# Patient Record
Sex: FEMALE | Race: BLACK OR AFRICAN AMERICAN | NOT HISPANIC OR LATINO | Employment: OTHER | URBAN - NONMETROPOLITAN AREA
[De-identification: names, ages, dates, MRNs, and addresses within clinical notes are randomized per-mention and may not be internally consistent; named-entity substitution may affect disease eponyms.]

---

## 2017-01-20 ENCOUNTER — OFFICE VISIT (OUTPATIENT)
Dept: ONCOLOGY | Facility: CLINIC | Age: 67
End: 2017-01-20

## 2017-01-20 ENCOUNTER — LAB (OUTPATIENT)
Dept: ONCOLOGY | Facility: CLINIC | Age: 67
End: 2017-01-20

## 2017-01-20 VITALS
OXYGEN SATURATION: 97 % | TEMPERATURE: 98.2 F | SYSTOLIC BLOOD PRESSURE: 121 MMHG | WEIGHT: 129 LBS | RESPIRATION RATE: 18 BRPM | HEART RATE: 62 BPM | DIASTOLIC BLOOD PRESSURE: 82 MMHG

## 2017-01-20 DIAGNOSIS — D64.9 ANEMIA: ICD-10-CM

## 2017-01-20 DIAGNOSIS — D64.9 ANEMIA, UNSPECIFIED TYPE: Primary | ICD-10-CM

## 2017-01-20 DIAGNOSIS — D64.9 ANEMIA, UNSPECIFIED TYPE: ICD-10-CM

## 2017-01-20 LAB
ALBUMIN SERPL-MCNC: 4.2 G/DL (ref 3.4–4.8)
ALBUMIN/GLOB SERPL: 1.2 G/DL (ref 1.5–2.5)
ALP SERPL-CCNC: 74 U/L (ref 46–116)
ALT SERPL W P-5'-P-CCNC: 10 U/L (ref 10–36)
ANION GAP SERPL CALCULATED.3IONS-SCNC: 7.8 MMOL/L (ref 3.6–11.2)
AST SERPL-CCNC: 25 U/L (ref 10–30)
BASOPHILS # BLD AUTO: 0.03 10*3/MM3 (ref 0–0.3)
BASOPHILS NFR BLD AUTO: 0.5 % (ref 0–2)
BILIRUB SERPL-MCNC: 0.3 MG/DL (ref 0.2–1.8)
BUN BLD-MCNC: 16 MG/DL (ref 7–21)
BUN/CREAT SERPL: 12.8 (ref 7–25)
CALCIUM SPEC-SCNC: 9.6 MG/DL (ref 7.7–10)
CHLORIDE SERPL-SCNC: 111 MMOL/L (ref 99–112)
CO2 SERPL-SCNC: 24.2 MMOL/L (ref 24.3–31.9)
CREAT BLD-MCNC: 1.25 MG/DL (ref 0.43–1.29)
DEPRECATED RDW RBC AUTO: 42.8 FL (ref 37–54)
EOSINOPHIL # BLD AUTO: 0.11 10*3/MM3 (ref 0–0.7)
EOSINOPHIL NFR BLD AUTO: 1.9 % (ref 0–5)
ERYTHROCYTE [DISTWIDTH] IN BLOOD BY AUTOMATED COUNT: 13.6 % (ref 11.5–14.5)
FERRITIN SERPL-MCNC: 51 NG/ML (ref 10–290.3)
FOLATE SERPL-MCNC: 13.7 NG/ML (ref 5.4–20)
GFR SERPL CREATININE-BSD FRML MDRD: 52 ML/MIN/1.73
GLOBULIN UR ELPH-MCNC: 3.4 GM/DL
GLUCOSE BLD-MCNC: 95 MG/DL (ref 70–110)
HCT VFR BLD AUTO: 36.4 % (ref 37–47)
HGB BLD-MCNC: 12.2 G/DL (ref 12–16)
IMM GRANULOCYTES # BLD: 0.01 10*3/MM3 (ref 0–0.03)
IMM GRANULOCYTES NFR BLD: 0.2 % (ref 0–0.5)
IRON 24H UR-MRATE: 85 MCG/DL (ref 49–151)
IRON SATN MFR SERPL: 26 % (ref 15–50)
LYMPHOCYTES # BLD AUTO: 2.7 10*3/MM3 (ref 1–3)
LYMPHOCYTES NFR BLD AUTO: 46.1 % (ref 21–51)
MCH RBC QN AUTO: 29 PG (ref 27–33)
MCHC RBC AUTO-ENTMCNC: 33.5 G/DL (ref 33–37)
MCV RBC AUTO: 86.5 FL (ref 80–94)
MONOCYTES # BLD AUTO: 0.52 10*3/MM3 (ref 0.1–0.9)
MONOCYTES NFR BLD AUTO: 8.9 % (ref 0–10)
NEUTROPHILS # BLD AUTO: 2.49 10*3/MM3 (ref 1.4–6.5)
NEUTROPHILS NFR BLD AUTO: 42.4 % (ref 30–70)
OSMOLALITY SERPL CALC.SUM OF ELEC: 286 MOSM/KG (ref 273–305)
PLATELET # BLD AUTO: 217 10*3/MM3 (ref 130–400)
PMV BLD AUTO: 10.6 FL (ref 6–10)
POTASSIUM BLD-SCNC: 4.7 MMOL/L (ref 3.5–5.3)
PROT SERPL-MCNC: 7.6 G/DL (ref 6–8)
RBC # BLD AUTO: 4.21 10*6/MM3 (ref 4.2–5.4)
SODIUM BLD-SCNC: 143 MMOL/L (ref 135–153)
TIBC SERPL-MCNC: 328 MCG/DL (ref 241–421)
VIT B12 BLD-MCNC: 826 PG/ML (ref 211–911)
WBC NRBC COR # BLD: 5.86 10*3/MM3 (ref 4.5–12.5)

## 2017-01-20 PROCEDURE — 82728 ASSAY OF FERRITIN: CPT | Performed by: INTERNAL MEDICINE

## 2017-01-20 PROCEDURE — 82746 ASSAY OF FOLIC ACID SERUM: CPT | Performed by: INTERNAL MEDICINE

## 2017-01-20 PROCEDURE — 80053 COMPREHEN METABOLIC PANEL: CPT | Performed by: INTERNAL MEDICINE

## 2017-01-20 PROCEDURE — 85025 COMPLETE CBC W/AUTO DIFF WBC: CPT | Performed by: INTERNAL MEDICINE

## 2017-01-20 PROCEDURE — 83550 IRON BINDING TEST: CPT | Performed by: INTERNAL MEDICINE

## 2017-01-20 PROCEDURE — 83540 ASSAY OF IRON: CPT | Performed by: INTERNAL MEDICINE

## 2017-01-20 PROCEDURE — 99213 OFFICE O/P EST LOW 20 MIN: CPT | Performed by: INTERNAL MEDICINE

## 2017-01-20 PROCEDURE — 82607 VITAMIN B-12: CPT | Performed by: INTERNAL MEDICINE

## 2017-01-20 RX ORDER — OXYBUTYNIN CHLORIDE 5 MG/1
5 TABLET ORAL 2 TIMES DAILY
Refills: 2 | COMMUNITY
Start: 2017-01-06 | End: 2017-12-08 | Stop reason: SDUPTHER

## 2017-01-20 RX ORDER — METOCLOPRAMIDE 10 MG/1
10 TABLET ORAL 4 TIMES DAILY
Refills: 1 | COMMUNITY
Start: 2017-01-13

## 2017-01-20 RX ORDER — CYCLOBENZAPRINE HCL 10 MG
10 TABLET ORAL 3 TIMES DAILY PRN
Refills: 1 | COMMUNITY
Start: 2017-01-06

## 2017-01-20 NOTE — PROGRESS NOTES
Name:  Gemma Hernandez  :  1950  Date:  2017     REFERRING PROVIDER  BAYRON Nielsen    PRIMARY CARE PROVIDER  BAYRON Nielsen    REASON FOR FOLLOWUP  1. Anemia, unspecified type      CHIEF COMPLAINT  Chronic fatigue, stable.    Dear Ms. Sibley,    HISTORY OF PRESENT ILLNESS:   I saw Ms. Hernandez in follow up today in our medical oncology clinic. As you are aware, she is a pleasant, 66 y.o.,  female with a history of rheumatoid arthritis and coronary artery disease who, in late summer 2014, was  also been noted to be borderline anemic. She was admitted to [facility] (in early 2014) after presenting to their ED with weakness and black stools. She has been on Plavix and ASA due to a history of stent placement; but, according to her, several repeated endoscopies (both upper and lower) over the years (including during  her most recent hospitalization ) have been largely unremarkable (per the discharge summary, mainly gastritis only). She was referred to our clinic for further outpatient evaluation regarding her anemia. At the time of her initial appointment with us (2014), she confirmed that she receives monthly injections with golimumab for her rheumatoid arthri tis (which she believes helps quite a bit). She also reported intermittent rectal bleeding (and, as mentioned above, was admitted for it in early 2014). A number of basic anemia st udies were ordered at that time, and it was ultimately felt that, while chronic inflammation (from her arthritis) and chronic kidney disease (likely from longstanding hypertension;  her creatinine is in the 1.2 - 1.3 range, and her erythropoietin level was not appropriately elevated) were contributing, iron deficiency was playing an increasing role (her ferritin level had decreased, from 32 to 17). She was started on maintenance ferrous sulfate and has been doing overall well in follow up ever since, with  improved (and now stable) iron stores and a stable, borderline low to low normal hemoglobin.     INTERIM HISTORY:  Ms. Hernandez returns to clinic today for follow up.  She has been off of ferrous sulfate completely for a little over a year now.  She is back to her usual, laxative-dependent, chronic constipation (with no noticeable, repeat GI bleeds recently). She otherwise continues to feel overall well and, other than chronic, but stable, fatigue, continues to have no new or specific complaints.    Past Medical History   Diagnosis Date   • Anemia    • CAD (coronary artery disease)    • Chronic kidney disease    • Hypertension    • Rheumatoid arthritis    • Sleep apnea        Past Surgical History   Procedure Laterality Date   • Tubal abdominal ligation       1972   • Hernia repair       1972       Social History     Social History   • Marital status:      Spouse name: N/A   • Number of children: N/A   • Years of education: N/A     Occupational History   • Not on file.     Social History Main Topics   • Smoking status: Former Smoker     Packs/day: 3.00     Years: 31.00     Quit date: 2001   • Smokeless tobacco: Not on file   • Alcohol use No   • Drug use: No   • Sexual activity: Not on file     Other Topics Concern   • Not on file     Social History Narrative       Family History   Problem Relation Age of Onset   • Cancer Other      breast   • Lymphoma Other    • Aneurysm Father    • Cancer Sister    • Clotting disorder Sister    • Aneurysm Brother        Allergies   Allergen Reactions   • Arthritis Pain Regimen [Aspirin] Hives and Nausea And Vomiting     All oral arthritis medications   • Sulfa Antibiotics        Current Outpatient Prescriptions   Medication Sig Dispense Refill   • alendronate (FOSAMAX) 70 MG tablet      • ASPIR-LOW 81 MG EC tablet      • CARTIA  MG 24 hr capsule      • cetirizine (ZyrTEC) 10 MG tablet      • clonazePAM (KlonoPIN) 1 MG tablet      • clopidogrel (PLAVIX) 75 MG tablet       • fluticasone (FLONASE) 50 MCG/ACT nasal spray      • gabapentin (NEURONTIN) 400 MG capsule      • HYDROcodone-acetaminophen (NORCO) 7.5-325 MG per tablet      • lisinopril (PRINIVIL,ZESTRIL) 10 MG tablet      • nitroglycerin (NITRODUR) 0.6 MG/HR patch      • NITROSTAT 0.4 MG SL tablet      • pravastatin (PRAVACHOL) 40 MG tablet      • promethazine (PHENERGAN) 25 MG tablet      • SIMPONI 50 MG/0.5ML solution auto-injector      • simvastatin (ZOCOR) 20 MG tablet      • VOLTAREN 1 % gel gel      • cyclobenzaprine (FLEXERIL) 10 MG tablet   1   • metoclopramide (REGLAN) 10 MG tablet   1   • oxybutynin (DITROPAN) 5 MG tablet   2     No current facility-administered medications for this visit.        REVIEW OF SYSTEMS  CONSTITUTIONAL:  No fever, chills or night sweats. Chronic fatigue, stable.  EYES:  No blurry vision, diplopia or other vision changes.  ENT:  No hearing loss, nosebleeds or sore throat.  CARDIOVASCULAR:  No palpitations, arrhythmia, syncopal episodes or edema.  PULMONARY:  No hemoptysis, wheezing, chronic cough or shortness of breath.  GASTROINTESTINAL:  No nausea or vomiting.  No abdominal pain. Chronic constipation, stable and still manageable with prn laxatives.  GENITOURINARY:  No hematuria, kidney stones or frequent urination.  MUSCULOSKELETAL:  No joint or back pains.  INTEGUMENTARY: No rashes or pruritus.  ENDOCRINE:  No excessive thirst or hot flashes.  HEMATOLOGIC:  No history of free bleeding, spontaneous bleeding or clotting.  IMMUNOLOGIC:  No allergies or frequent infections.  NEUROLOGIC: No numbness, tingling, seizures or weakness.  PSYCHIATRIC:  No anxiety or depression.    PHYSICAL EXAMINATION    Visit Vitals   • /82   • Pulse 62   • Temp 98.2 °F (36.8 °C) (Oral)   • Resp 18   • Wt 129 lb (58.5 kg)   • SpO2 97%       GENERAL:  A well-developed, well-nourished,  female in no acute distress.  HEENT:  Pupils equally round and reactive to light.  Extraocular muscles  intact.  CARDIOVASCULAR:  Regular rate and rhythm.  No murmurs, gallops or rubs.  LUNGS:  Clear to auscultation bilaterally.  ABDOMEN:  Soft, nontender, nondistended with positive bowel sounds.  EXTREMITIES:  No clubbing, cyanosis or edema bilaterally.  SKIN:  No rashes or petechiae.  NEURO:  Cranial nerves grossly intact.  No focal deficits.  PSYCH:  Alert and oriented x3.    LABORATORY    Lab Results   Component Value Date    WBC 5.86 01/20/2017    HGB 12.2 01/20/2017    HCT 36.4 (L) 01/20/2017    MCV 86.5 01/20/2017     01/20/2017    NEUTROABS 2.49 01/20/2017       Lab Results   Component Value Date     01/20/2017    K 4.7 01/20/2017     01/20/2017    CO2 24.2 (L) 01/20/2017    BUN 16 01/20/2017    CREATININE 1.25 01/20/2017    GLUCOSE 95 01/20/2017    CALCIUM 9.6 01/20/2017    AST 25 01/20/2017    ALT 10 01/20/2017    ALKPHOS 74 01/20/2017    BILITOT 0.3 01/20/2017    PROTEINTOT 7.6 01/20/2017    ALBUMIN 4.20 01/20/2017     CBC (01/20/2017): WBCs: 5.8; HgB: 12.2; Hct: 36.4; platelets: 217; MCV: 86.5  CBC (07/20/2016): WBCs: 5.4; HgB: 11.4; Hct: 35.0; platelets: 228; MCV: 86.8  CBC (12/28/2015): WBCs: 5.1; HgB: 11.3; Hct: 32.7; platelets: 208; MCV: 81.5    CBC (06/26/2015): WBCs: 4.6; HgB: 11.7; Hct: 36.6; platelets: 226; MCV: 85.7    CBC (03/31/2015): WBCs: 5.5; HgB: 12.6; Hct: 39.2; platelets: 246; MCV: 86.5    CBC (12/18/2014): WBCs: 4.7; HgB: 11.9; Hct: 36.2; platelets: 238; MCV: 86.0    CBC (09/30/2014): WBCs: 5.6; HgB: 10.7; Hct: 33.3; platelets: 213; MCV: 85.6    CBC (08/02/2014): WBCs: 5.2; HgB: 11.5; Hct: 36.4; platelets: 239; MCV: 86     Iron profile (01/20/2017): serum iron: 85; % saturation: 26; TIBC: 328; ferritin: 51.0 ng/mL  Iron profile (07/20/2016): serum iron: 60; % saturation: 18; TIBC: 327; ferritin: 40.0    Iron profile (12/28/2015): serum iron: 83; % saturation: 28; TIBC: 292; ferritin: 60.0    Iron profile (06/26/2015): serum iron: 102; % saturation: 32; TIBC: 320;  ferritin: 47.0    Iron profile (03/31/2015): serum iron: 96; % saturation: 29; TIBC: 331; ferritin: 58.0    Iron profile (12/18/2014): serum iron: 55; % saturation: 21; TIBC: 263; ferritin: 34.0     Vitamin B12 (01/20/2017): 836 pg/mL; serum folate (01/20/2017): 13.70 ng/mL  Vitamin B12 (03/31/2015): 849; serum folate (03/31/2015): > 24.00    TSH (12/18/2014): 0.889; free T4: 0.87      Studies from 09/16/2014:    Iron panel: serum iron: 130; % saturation: 40; TIBC: 327; ferritin: 17.0    Vitamin B12: 538; methylmalonic acid: 155; serum folate: 18.89    Retic count: Percent: 0.8%; Absolute: 0.03    TSH: 0.747; Haptoglobin: 151    Erythropoietin, serum: 9.0      Serum ferritin (07/30/2014): 32.0      IMAGING    PATHOLOGY    IMPRESSION AND PLAN  Ms. Hernandez is a 66 y.o.,  female with:  Anemia: Multifactorial and mild, with a HgB generally in the low to mid 11 range at worst and otherwise unremarkable CBCs. Based on the initial workup performed at her initial clinic visit with us in September 2014, a few issues appear to have been (and some still are) playing a role: 1) Although her serum iron has remained within normal limits, her iron stores had been on the low side; and, by fall 2014, were getting worse (ferritin decreased from 32 ng/mL in late July to 17 by September 2014), suggesting a borderline iron deficiency that was not surprising, given her history of intermittent rectal bleeding and lifelong, routine menstrual blood loss (up until the late 2000s), 2) Her baseline creatinine of 1.2 - 1.3 and lack of an increased erythropoietin level suggested a component of chronic kidney disease, which would not be surprising given her history of hypertension and CAD, and 3) Although not severely elevated, the high normal ESR and history of rheumatoid arthritis may be contributing via an additional component of chronic inflammation. At that point, the most readily reversible issue, the borderline iron stores,  were treated with a new Rx for ferrous sulfate and today, over two years later, they remain much improved and adequate (ferritin of 51.0). She has actually not been taking ferrous sulfate for over a year now. Meanwhile, her HgB remains borderline low at worst (it is actually WNL today: 12.2 g/dL) and stable. We will continue to help monitor her on a routine basis (still [id number] for now, as we have been doing) with repeat labs and clinic visits. The patient was in agreement with this plan.    It is a pleasure to participate in Ms. Hernandez's care.  Please do not hesitate to call with any questions or concerns that you may have.    A total of 20 minutes were spent coordinating this patient’s care in clinic today; 15 minutes of which were face-to-face with the patient, reviewing her interim medical history, discussing the results of today's labs and counseling on the current followup plan.  All questions were answered to her satisfaction.    FOLLOW UP  Return to clinic in 6 months with a CBC, CMP, iron panel and ferritin level.        This document was electronically signed by MELISSA Yap MD January 20, 2017 3:00 PM      CC: BAYRON Nielsen

## 2017-01-20 NOTE — MR AVS SNAPSHOT
Gemma Hernandez   1/20/2017 2:30 PM   Office Visit    Dept Phone:  [phone]   Encounter #:  [id number]    Provider:  MELISSA Yap MD   Department:  [facility] HEMATOLOGY  AND ONCOLOGY                Your Full Care Plan              Today's Medication Changes          These changes are accurate as of: 1/20/17  3:20 PM.  If you have any questions, ask your nurse or doctor.               Stop taking medication(s)listed here:     fexofenadine 60 MG tablet   Commonly known as:  ALLEGRA   Stopped by:  MELISSA Yap MD           GENERLAC 10 GM/15ML solution solution (encephalopathy)   Generic drug:  lactulose   Stopped by:  MELISSA Yap MD           isosorbide mononitrate 30 MG 24 hr tablet   Commonly known as:  IMDUR   Stopped by:  MELISSA Yap MD                      Your Updated Medication List          This list is accurate as of: 1/20/17  3:20 PM.  Always use your most recent med list.                alendronate 70 MG tablet   Commonly known as:  FOSAMAX       ASPIR-LOW 81 MG EC tablet   Generic drug:  aspirin       CARTIA  MG 24 hr capsule   Generic drug:  diltiaZEM CD       cetirizine 10 MG tablet   Commonly known as:  zyrTEC       clonazePAM 1 MG tablet   Commonly known as:  KlonoPIN       clopidogrel 75 MG tablet   Commonly known as:  PLAVIX       cyclobenzaprine 10 MG tablet   Commonly known as:  FLEXERIL       fluticasone 50 MCG/ACT nasal spray   Commonly known as:  FLONASE       gabapentin 400 MG capsule   Commonly known as:  NEURONTIN       HYDROcodone-acetaminophen 7.5-325 MG per tablet   Commonly known as:  NORCO       lisinopril 10 MG tablet   Commonly known as:  PRINIVIL,ZESTRIL       metoclopramide 10 MG tablet   Commonly known as:  REGLAN       * NITROSTAT 0.4 MG SL tablet   Generic drug:  nitroglycerin       * nitroglycerin 0.6 MG/HR patch   Commonly known as:  NITRODUR       oxybutynin 5 MG tablet   Commonly known as:  DITROPAN       pravastatin 40 MG tablet   Commonly known as:  PRAVACHOL       promethazine 25 MG tablet   Commonly known as:  PHENERGAN       SIMPONI 50 MG/0.5ML solution auto-injector   Generic drug:  Golimumab       simvastatin 20 MG tablet   Commonly known as:  ZOCOR       VOLTAREN 1 % gel gel   Generic drug:  diclofenac       * Notice:  This list has 2 medication(s) that are the same as other medications prescribed for you. Read the directions carefully, and ask your doctor or other care provider to review them with you.            You Were Diagnosed With        Codes Comments    Anemia, unspecified type    -  Primary ICD-10-CM: D64.9  ICD-9-CM: 285.9       Instructions     None    Patient Instructions History      Upcoming Appointments     Visit Type Date Time Department    FOLLOW UP 1/20/2017  2:30 PM [facility]    LAB 1/20/2017  3:15 PM [facility]    LAB 7/20/2017  1:15 PM [facility]    FOLLOW UP 7/20/2017  1:45 PM [facility]      MyChart Signup     Our records indicate that you have declined [facility] MyChart signup. If you would like to sign up for [employer], please email [email or web address] or call [phone] to obtain an activation code.             Other Info from Your Visit           Your Appointments     Jul 20, 2017  1:15 PM EDT   LAB with [facility] NURSE LAB   [facility] HEMATOLOGY  AND ONCOLOGY ([place])    [address] [place] [address] [place] [address]   [phone]            Jul 20, 2017  1:45 PM EDT   Follow Up with MELISSA Yap MD   [facility] HEMATOLOGY  AND ONCOLOGY ([place])    [address] [place] [address] [place] [address]   [phone]           Arrive 15 minutes prior to appointment.              Allergies     Arthritis Pain Regimen [Aspirin]  Hives, Nausea And Vomiting    All oral arthritis medications    Sulfa Antibiotics        Vital Signs     Blood Pressure Pulse Temperature Respirations Weight Oxygen Saturation     121/82 62 98.2 °F (36.8 °C) (Oral) 18 129 lb (58.5 kg) 97%    Smoking Status                   Former Smoker           Problems and Diagnoses Noted     Anemia

## 2017-04-24 ENCOUNTER — TRANSCRIBE ORDERS (OUTPATIENT)
Dept: ADMINISTRATIVE | Facility: HOSPITAL | Age: 67
End: 2017-04-24

## 2017-04-24 DIAGNOSIS — R94.39 ABNORMAL STRESS TEST: Primary | ICD-10-CM

## 2017-05-02 ENCOUNTER — HOSPITAL ENCOUNTER (OUTPATIENT)
Facility: HOSPITAL | Age: 67
Setting detail: HOSPITAL OUTPATIENT SURGERY
Discharge: HOME | End: 2017-05-02
Attending: INTERNAL MEDICINE | Admitting: INTERNAL MEDICINE

## 2017-05-02 VITALS
HEIGHT: 61 IN | OXYGEN SATURATION: 99 % | DIASTOLIC BLOOD PRESSURE: 98 MMHG | WEIGHT: 129.19 LBS | RESPIRATION RATE: 16 BRPM | BODY MASS INDEX: 24.39 KG/M2 | SYSTOLIC BLOOD PRESSURE: 148 MMHG | HEART RATE: 59 BPM | TEMPERATURE: 98 F

## 2017-05-02 DIAGNOSIS — R94.39 ABNORMAL STRESS TEST: ICD-10-CM

## 2017-05-02 LAB
ANION GAP SERPL CALCULATED.3IONS-SCNC: 4 MMOL/L (ref 3–11)
BUN BLD-MCNC: 15 MG/DL (ref 9–23)
BUN/CREAT SERPL: 15 (ref 7–25)
CALCIUM SPEC-SCNC: 10 MG/DL (ref 8.7–10.4)
CHLORIDE SERPL-SCNC: 110 MMOL/L (ref 99–109)
CO2 SERPL-SCNC: 26 MMOL/L (ref 20–31)
CREAT BLD-MCNC: 1 MG/DL (ref 0.6–1.3)
DEPRECATED RDW RBC AUTO: 46.6 FL (ref 37–54)
ERYTHROCYTE [DISTWIDTH] IN BLOOD BY AUTOMATED COUNT: 14 % (ref 11.3–14.5)
GFR SERPL CREATININE-BSD FRML MDRD: 68 ML/MIN/1.73
GLUCOSE BLD-MCNC: 96 MG/DL (ref 70–100)
HCT VFR BLD AUTO: 35.4 % (ref 34.5–44)
HGB BLD-MCNC: 11.2 G/DL (ref 11.5–15.5)
MCH RBC QN AUTO: 28.4 PG (ref 27–31)
MCHC RBC AUTO-ENTMCNC: 31.6 G/DL (ref 32–36)
MCV RBC AUTO: 89.8 FL (ref 80–99)
PLATELET # BLD AUTO: 229 10*3/MM3 (ref 150–450)
PMV BLD AUTO: 9.8 FL (ref 6–12)
POTASSIUM BLD-SCNC: 4.2 MMOL/L (ref 3.5–5.5)
RBC # BLD AUTO: 3.94 10*6/MM3 (ref 3.89–5.14)
SODIUM BLD-SCNC: 140 MMOL/L (ref 132–146)
WBC NRBC COR # BLD: 5.88 10*3/MM3 (ref 3.5–10.8)

## 2017-05-02 PROCEDURE — 80048 BASIC METABOLIC PNL TOTAL CA: CPT | Performed by: INTERNAL MEDICINE

## 2017-05-02 PROCEDURE — 93458 L HRT ARTERY/VENTRICLE ANGIO: CPT | Performed by: INTERNAL MEDICINE

## 2017-05-02 PROCEDURE — C1769 GUIDE WIRE: HCPCS | Performed by: INTERNAL MEDICINE

## 2017-05-02 PROCEDURE — 25010000002 FENTANYL CITRATE (PF) 100 MCG/2ML SOLUTION: Performed by: INTERNAL MEDICINE

## 2017-05-02 PROCEDURE — 25010000002 MIDAZOLAM PER 1 MG: Performed by: INTERNAL MEDICINE

## 2017-05-02 PROCEDURE — 25010000002 HEPARIN (PORCINE) PER 1000 UNITS: Performed by: INTERNAL MEDICINE

## 2017-05-02 PROCEDURE — 85027 COMPLETE CBC AUTOMATED: CPT | Performed by: INTERNAL MEDICINE

## 2017-05-02 PROCEDURE — 36415 COLL VENOUS BLD VENIPUNCTURE: CPT

## 2017-05-02 PROCEDURE — 0 IOPAMIDOL PER 1 ML: Performed by: INTERNAL MEDICINE

## 2017-05-02 PROCEDURE — C1894 INTRO/SHEATH, NON-LASER: HCPCS | Performed by: INTERNAL MEDICINE

## 2017-05-02 RX ORDER — NALOXONE HCL 0.4 MG/ML
0.4 VIAL (ML) INJECTION
Status: DISCONTINUED | OUTPATIENT
Start: 2017-05-02 | End: 2017-05-02 | Stop reason: HOSPADM

## 2017-05-02 RX ORDER — ACETAMINOPHEN 325 MG/1
650 TABLET ORAL EVERY 4 HOURS PRN
Status: DISCONTINUED | OUTPATIENT
Start: 2017-05-02 | End: 2017-05-02 | Stop reason: HOSPADM

## 2017-05-02 RX ORDER — LIDOCAINE HYDROCHLORIDE 10 MG/ML
INJECTION, SOLUTION INFILTRATION; PERINEURAL AS NEEDED
Status: DISCONTINUED | OUTPATIENT
Start: 2017-05-02 | End: 2017-05-02 | Stop reason: HOSPADM

## 2017-05-02 RX ORDER — MIDAZOLAM HYDROCHLORIDE 1 MG/ML
INJECTION INTRAMUSCULAR; INTRAVENOUS AS NEEDED
Status: DISCONTINUED | OUTPATIENT
Start: 2017-05-02 | End: 2017-05-02 | Stop reason: HOSPADM

## 2017-05-02 RX ORDER — HYDROCODONE BITARTRATE AND ACETAMINOPHEN 5; 325 MG/1; MG/1
1 TABLET ORAL EVERY 4 HOURS PRN
Status: DISCONTINUED | OUTPATIENT
Start: 2017-05-02 | End: 2017-05-02 | Stop reason: HOSPADM

## 2017-05-02 RX ORDER — SODIUM CHLORIDE 9 MG/ML
250 INJECTION, SOLUTION INTRAVENOUS CONTINUOUS
Status: ACTIVE | OUTPATIENT
Start: 2017-05-02 | End: 2017-05-02

## 2017-05-02 RX ORDER — FENTANYL CITRATE 50 UG/ML
INJECTION, SOLUTION INTRAMUSCULAR; INTRAVENOUS AS NEEDED
Status: DISCONTINUED | OUTPATIENT
Start: 2017-05-02 | End: 2017-05-02 | Stop reason: HOSPADM

## 2017-05-02 RX ORDER — MORPHINE SULFATE 2 MG/ML
1 INJECTION, SOLUTION INTRAMUSCULAR; INTRAVENOUS EVERY 4 HOURS PRN
Status: DISCONTINUED | OUTPATIENT
Start: 2017-05-02 | End: 2017-05-02 | Stop reason: HOSPADM

## 2017-05-02 RX ORDER — TEMAZEPAM 7.5 MG/1
7.5 CAPSULE ORAL NIGHTLY PRN
Status: DISCONTINUED | OUTPATIENT
Start: 2017-05-02 | End: 2017-05-02 | Stop reason: HOSPADM

## 2017-05-02 RX ORDER — ALPRAZOLAM 0.25 MG/1
0.25 TABLET ORAL 3 TIMES DAILY PRN
Status: DISCONTINUED | OUTPATIENT
Start: 2017-05-02 | End: 2017-05-02 | Stop reason: HOSPADM

## 2017-05-02 RX ADMIN — HYDROCODONE BITARTRATE AND ACETAMINOPHEN 1 TABLET: 5; 325 TABLET ORAL at 15:06

## 2017-07-26 ENCOUNTER — OFFICE VISIT (OUTPATIENT)
Dept: ONCOLOGY | Facility: CLINIC | Age: 67
End: 2017-07-26

## 2017-07-26 ENCOUNTER — LAB (OUTPATIENT)
Dept: ONCOLOGY | Facility: CLINIC | Age: 67
End: 2017-07-26

## 2017-07-26 VITALS
HEART RATE: 74 BPM | BODY MASS INDEX: 24.56 KG/M2 | DIASTOLIC BLOOD PRESSURE: 81 MMHG | RESPIRATION RATE: 18 BRPM | WEIGHT: 130 LBS | SYSTOLIC BLOOD PRESSURE: 123 MMHG | OXYGEN SATURATION: 96 % | TEMPERATURE: 98 F

## 2017-07-26 DIAGNOSIS — D64.9 ANEMIA, UNSPECIFIED TYPE: ICD-10-CM

## 2017-07-26 DIAGNOSIS — D64.9 ANEMIA, UNSPECIFIED TYPE: Primary | ICD-10-CM

## 2017-07-26 LAB
ALBUMIN SERPL-MCNC: 4.4 G/DL (ref 3.4–4.8)
ALBUMIN/GLOB SERPL: 1.2 G/DL (ref 1.5–2.5)
ALP SERPL-CCNC: 81 U/L (ref 35–104)
ALT SERPL W P-5'-P-CCNC: 14 U/L (ref 10–36)
ANION GAP SERPL CALCULATED.3IONS-SCNC: 7.4 MMOL/L (ref 3.6–11.2)
AST SERPL-CCNC: 25 U/L (ref 10–30)
BASOPHILS # BLD AUTO: 0.03 10*3/MM3 (ref 0–0.3)
BASOPHILS NFR BLD AUTO: 0.5 % (ref 0–2)
BILIRUB SERPL-MCNC: 0.3 MG/DL (ref 0.2–1.8)
BUN BLD-MCNC: 30 MG/DL (ref 7–21)
BUN/CREAT SERPL: 25.9 (ref 7–25)
CALCIUM SPEC-SCNC: 9.5 MG/DL (ref 7.7–10)
CHLORIDE SERPL-SCNC: 106 MMOL/L (ref 99–112)
CO2 SERPL-SCNC: 24.6 MMOL/L (ref 24.3–31.9)
CREAT BLD-MCNC: 1.16 MG/DL (ref 0.43–1.29)
DEPRECATED RDW RBC AUTO: 41.9 FL (ref 37–54)
EOSINOPHIL # BLD AUTO: 0.12 10*3/MM3 (ref 0–0.7)
EOSINOPHIL NFR BLD AUTO: 1.9 % (ref 0–5)
ERYTHROCYTE [DISTWIDTH] IN BLOOD BY AUTOMATED COUNT: 13.6 % (ref 11.5–14.5)
FERRITIN SERPL-MCNC: 54 NG/ML (ref 10–290.3)
GFR SERPL CREATININE-BSD FRML MDRD: 57 ML/MIN/1.73
GLOBULIN UR ELPH-MCNC: 3.6 GM/DL
GLUCOSE BLD-MCNC: 98 MG/DL (ref 70–110)
HCT VFR BLD AUTO: 36 % (ref 37–47)
HGB BLD-MCNC: 12 G/DL (ref 12–16)
IMM GRANULOCYTES # BLD: 0.02 10*3/MM3 (ref 0–0.03)
IMM GRANULOCYTES NFR BLD: 0.3 % (ref 0–0.5)
IRON 24H UR-MRATE: 88 MCG/DL (ref 49–151)
IRON SATN MFR SERPL: 27 % (ref 15–50)
LYMPHOCYTES # BLD AUTO: 2.45 10*3/MM3 (ref 1–3)
LYMPHOCYTES NFR BLD AUTO: 39.7 % (ref 21–51)
MCH RBC QN AUTO: 28.3 PG (ref 27–33)
MCHC RBC AUTO-ENTMCNC: 33.3 G/DL (ref 33–37)
MCV RBC AUTO: 84.9 FL (ref 80–94)
MONOCYTES # BLD AUTO: 0.33 10*3/MM3 (ref 0.1–0.9)
MONOCYTES NFR BLD AUTO: 5.3 % (ref 0–10)
NEUTROPHILS # BLD AUTO: 3.22 10*3/MM3 (ref 1.4–6.5)
NEUTROPHILS NFR BLD AUTO: 52.3 % (ref 30–70)
OSMOLALITY SERPL CALC.SUM OF ELEC: 281.8 MOSM/KG (ref 273–305)
PLATELET # BLD AUTO: 231 10*3/MM3 (ref 130–400)
PMV BLD AUTO: 9.9 FL (ref 6–10)
POTASSIUM BLD-SCNC: 4.9 MMOL/L (ref 3.5–5.3)
PROT SERPL-MCNC: 8 G/DL (ref 6–8)
RBC # BLD AUTO: 4.24 10*6/MM3 (ref 4.2–5.4)
SODIUM BLD-SCNC: 138 MMOL/L (ref 135–153)
TIBC SERPL-MCNC: 321 MCG/DL (ref 241–421)
WBC NRBC COR # BLD: 6.17 10*3/MM3 (ref 4.5–12.5)

## 2017-07-26 PROCEDURE — 85025 COMPLETE CBC W/AUTO DIFF WBC: CPT | Performed by: INTERNAL MEDICINE

## 2017-07-26 PROCEDURE — 82728 ASSAY OF FERRITIN: CPT | Performed by: INTERNAL MEDICINE

## 2017-07-26 PROCEDURE — 80053 COMPREHEN METABOLIC PANEL: CPT | Performed by: INTERNAL MEDICINE

## 2017-07-26 PROCEDURE — 99213 OFFICE O/P EST LOW 20 MIN: CPT | Performed by: INTERNAL MEDICINE

## 2017-07-26 PROCEDURE — 83550 IRON BINDING TEST: CPT | Performed by: INTERNAL MEDICINE

## 2017-07-26 PROCEDURE — 83540 ASSAY OF IRON: CPT | Performed by: INTERNAL MEDICINE

## 2017-07-26 NOTE — PROGRESS NOTES
Name:  Gemma Hernandez  :  1950  Date:  2017     REFERRING PROVIDER  BAYRON Nielsen    PRIMARY CARE PROVIDER  Zaira Cleary MD    REASON FOR FOLLOWUP  1. Anemia, unspecified type      CHIEF COMPLAINT  Chronic fatigue, stable.    Dear Ms. Sibley,    HISTORY OF PRESENT ILLNESS:   I saw Ms. Hernandez in follow up today in our medical oncology clinic. As you are aware, she is a pleasant, 66 y.o.,  female with a history of rheumatoid arthritis and coronary artery disease who, in late summer 2014, was  also been noted to be borderline anemic. She was admitted to [facility] (in early 2014) after presenting to their ED with weakness and black stools. She has been on Plavix and ASA due to a history of stent placement; but, according to her, several repeated endoscopies (both upper and lower) over the years (including during  her most recent hospitalization ) have been largely unremarkable (per the discharge summary, mainly gastritis only). She was referred to our clinic for further outpatient evaluation regarding her anemia. At the time of her initial appointment with us (2014), she confirmed that she receives monthly injections with golimumab for her rheumatoid arthri tis (which she believes helps quite a bit). She also reported intermittent rectal bleeding (and, as mentioned above, was admitted for it in early 2014). A number of basic anemia st udies were ordered at that time, and it was ultimately felt that, while chronic inflammation (from her arthritis) and chronic kidney disease (likely from longstanding hypertension;  her creatinine is in the 1.2 - 1.3 range, and her erythropoietin level was not appropriately elevated) were contributing, iron deficiency was playing an increasing role (her ferritin level had decreased, from 32 to 17). She was started on, at most, maintenance ferrous sulfate and has been doing overall well in follow up ever since,  with improved (and now stable) iron stores and a stable, borderline low to low normal hemoglobin.     INTERIM HISTORY:  Ms. Hernandez returns to clinic today for follow up.  She has been off of ferrous sulfate completely for a little over one and one half years now  She is back to her usual, laxative-dependent, chronic constipation (with no noticeable, repeat GI bleeds recently). She otherwise continues to feel overall well and, other than chronic, but stable, fatigue, continues to have no new or specific complaints.    Past Medical History:   Diagnosis Date   • Anemia    • CAD (coronary artery disease)    • Chronic kidney disease    • Hypertension    • Rheumatoid arthritis    • Sleep apnea        Past Surgical History:   Procedure Laterality Date   • CARDIAC CATHETERIZATION     • CARDIAC CATHETERIZATION N/A 5/2/2017    Procedure: Left Heart Cath;  Surgeon: Dickson Lea MD;  Location: [facility] CATH INVASIVE LOCATION;  Service:    • HERNIA REPAIR      1972   • TUBAL ABDOMINAL LIGATION      1972       Social History     Social History   • Marital status:      Spouse name: N/A   • Number of children: N/A   • Years of education: N/A     Occupational History   • Not on file.     Social History Main Topics   • Smoking status: Former Smoker     Packs/day: 3.00     Years: 31.00     Quit date: 2001   • Smokeless tobacco: Never Used   • Alcohol use No   • Drug use: No   • Sexual activity: Defer     Other Topics Concern   • Not on file     Social History Narrative       Family History   Problem Relation Age of Onset   • Cancer Other      breast   • Lymphoma Other    • Aneurysm Father    • Cancer Sister    • Clotting disorder Sister    • Aneurysm Brother        Allergies   Allergen Reactions   • Arthritis Pain Regimen [Aspirin] Hives and Nausea And Vomiting     All oral arthritis medications   • Sulfa Antibiotics        Current Outpatient Prescriptions   Medication Sig Dispense Refill   • Acetaminophen (TYLENOL) 325  MG capsule Take 650 mg by mouth Every 8 (Eight) Hours As Needed.     • alendronate (FOSAMAX) 70 MG tablet Every 7 (Seven) Days.     • ASPIR-LOW 81 MG EC tablet      • CARTIA  MG 24 hr capsule Take 240 mg by mouth Daily.     • cetirizine (ZyrTEC) 10 MG tablet Take 10 mg by mouth Daily.     • clonazePAM (KlonoPIN) 1 MG tablet 1 mg 2 (Two) Times a Day As Needed.     • clopidogrel (PLAVIX) 75 MG tablet      • cyclobenzaprine (FLEXERIL) 10 MG tablet Take 10 mg by mouth 3 (Three) Times a Day As Needed.  1   • fluticasone (FLONASE) 50 MCG/ACT nasal spray 2 sprays into each nostril Daily.     • gabapentin (NEURONTIN) 400 MG capsule Take 400 mg by mouth 3 (Three) Times a Day.     • HYDROcodone-acetaminophen (NORCO) 7.5-325 MG per tablet 1 tablet 2 (Two) Times a Day.     • lisinopril (PRINIVIL,ZESTRIL) 10 MG tablet Take 10 mg by mouth Daily.     • metoclopramide (REGLAN) 10 MG tablet Take 10 mg by mouth 4 (Four) Times a Day.  1   • nitroglycerin (NITRODUR) 0.6 MG/HR patch      • NITROSTAT 0.4 MG SL tablet      • oxybutynin (DITROPAN) 5 MG tablet Take 5 mg by mouth 2 (Two) Times a Day.  2   • pravastatin (PRAVACHOL) 40 MG tablet 40 mg Daily.     • promethazine (PHENERGAN) 25 MG tablet Take 25 mg by mouth Every 6 (Six) Hours As Needed.     • SIMPONI 50 MG/0.5ML solution auto-injector Inject 1 dose into the shoulder, thigh, or buttocks Every 30 (Thirty) Days.     • simvastatin (ZOCOR) 20 MG tablet Take 20 mg by mouth Daily.     • VOLTAREN 1 % gel gel Apply 4 g topically 2 (Two) Times a Day.       No current facility-administered medications for this visit.        REVIEW OF SYSTEMS  CONSTITUTIONAL:  No fever, chills or night sweats. Chronic fatigue, stable.  EYES:  No blurry vision, diplopia or other vision changes.  ENT:  No hearing loss, nosebleeds or sore throat.  CARDIOVASCULAR:  No palpitations, arrhythmia, syncopal episodes or edema.  PULMONARY:  No hemoptysis, wheezing, chronic cough or shortness of  breath.  GASTROINTESTINAL:  No nausea or vomiting.  No abdominal pain. Chronic constipation, stable and still manageable with prn laxatives.  GENITOURINARY:  No hematuria, kidney stones or frequent urination.  MUSCULOSKELETAL:  No joint or back pains.  INTEGUMENTARY: No rashes or pruritus.  ENDOCRINE:  No excessive thirst or hot flashes.  HEMATOLOGIC:  No history of free bleeding, spontaneous bleeding or clotting.  IMMUNOLOGIC:  No allergies or frequent infections.  NEUROLOGIC: No numbness, tingling, seizures or weakness.  PSYCHIATRIC:  No anxiety or depression.    PHYSICAL EXAMINATION    /81  Pulse 74  Temp 98 °F (36.7 °C) (Oral)   Resp 18  Wt 130 lb (59 kg)  SpO2 96%  BMI 24.56 kg/m2    GENERAL:  A well-developed, well-nourished,  female in no acute distress.  HEENT:  Pupils equally round and reactive to light.  Extraocular muscles intact.  CARDIOVASCULAR:  Regular rate and rhythm.  No murmurs, gallops or rubs.  LUNGS:  Clear to auscultation bilaterally.  ABDOMEN:  Soft, nontender, nondistended with positive bowel sounds.  EXTREMITIES:  No clubbing, cyanosis or edema bilaterally.  SKIN:  No rashes or petechiae.  NEURO:  Cranial nerves grossly intact.  No focal deficits.  PSYCH:  Alert and oriented x3.    LABORATORY    Lab Results   Component Value Date    WBC 6.17 07/26/2017    HGB 12.0 07/26/2017    HCT 36.0 (L) 07/26/2017    MCV 84.9 07/26/2017     07/26/2017    NEUTROABS 3.22 07/26/2017       Lab Results   Component Value Date     05/02/2017    K 4.2 05/02/2017     (H) 05/02/2017    CO2 26.0 05/02/2017    BUN 15 05/02/2017    CREATININE 1.00 05/02/2017    GLUCOSE 96 05/02/2017    CALCIUM 10.0 05/02/2017    AST 25 01/20/2017    ALT 10 01/20/2017    ALKPHOS 74 01/20/2017    BILITOT 0.3 01/20/2017    PROTEINTOT 7.6 01/20/2017    ALBUMIN 4.20 01/20/2017     CBC (07/26/2017): WBCs: 6.1; HgB: 12.0; Hct: 36.0; platelets: 231; MCV: 84.9  CBC (05/02/2017): WBCs: 5.8; HgB:  11.2; Hct: 35.4; platelets: 229; MCV: 89.8  CBC (01/20/2017): WBCs: 5.8; HgB: 12.2; Hct: 36.4; platelets: 217; MCV: 86.5  CBC (07/20/2016): WBCs: 5.4; HgB: 11.4; Hct: 35.0; platelets: 228; MCV: 86.8  CBC (12/28/2015): WBCs: 5.1; HgB: 11.3; Hct: 32.7; platelets: 208; MCV: 81.5    CBC (06/26/2015): WBCs: 4.6; HgB: 11.7; Hct: 36.6; platelets: 226; MCV: 85.7    CBC (03/31/2015): WBCs: 5.5; HgB: 12.6; Hct: 39.2; platelets: 246; MCV: 86.5    CBC (12/18/2014): WBCs: 4.7; HgB: 11.9; Hct: 36.2; platelets: 238; MCV: 86.0    CBC (09/30/2014): WBCs: 5.6; HgB: 10.7; Hct: 33.3; platelets: 213; MCV: 85.6    CBC (08/02/2014): WBCs: 5.2; HgB: 11.5; Hct: 36.4; platelets: 239; MCV: 86     Iron profile (07/26/2017): serum iron: 88; % saturation: 27; TIBC: 321; ferritin: 54.0 ng/mL  Iron profile (01/20/2017): serum iron: 85; % saturation: 26; TIBC: 328; ferritin: 51.0  Iron profile (07/20/2016): serum iron: 60; % saturation: 18; TIBC: 327; ferritin: 40.0    Iron profile (12/28/2015): serum iron: 83; % saturation: 28; TIBC: 292; ferritin: 60.0    Iron profile (06/26/2015): serum iron: 102; % saturation: 32; TIBC: 320; ferritin: 47.0    Iron profile (03/31/2015): serum iron: 96; % saturation: 29; TIBC: 331; ferritin: 58.0    Iron profile (12/18/2014): serum iron: 55; % saturation: 21; TIBC: 263; ferritin: 34.0     Vitamin B12 (01/20/2017): 836 pg/mL; serum folate (01/20/2017): 13.70 ng/mL  Vitamin B12 (03/31/2015): 849; serum folate (03/31/2015): > 24.00    TSH (12/18/2014): 0.889; free T4: 0.87      Studies from 09/16/2014:    Iron panel: serum iron: 130; % saturation: 40; TIBC: 327; ferritin: 17.0    Vitamin B12: 538; methylmalonic acid: 155; serum folate: 18.89    Retic count: Percent: 0.8%; Absolute: 0.03    TSH: 0.747; Haptoglobin: 151    Erythropoietin, serum: 9.0      Serum ferritin (07/30/2014): 32.0      IMAGING    PATHOLOGY    IMPRESSION AND PLAN  Ms. Hernandez is a 66 y.o.,  female with:  Anemia: Multifactorial  and mild, with a HgB generally in the low to mid 11 range at worst and otherwise unremarkable CBCs. Based on the initial workup performed at her initial clinic visit with us in September 2014, a few issues appear to have been (and some still are) playing a role: 1) Although her serum iron has remained within normal limits, her iron stores had been on the low side; and, by fall 2014, were getting worse (ferritin decreased from 32 ng/mL in late July to 17 by September 2014), suggesting a borderline iron deficiency that was not surprising, given her history of intermittent rectal bleeding and lifelong, routine menstrual blood loss (up until the late 2000s), 2) Her baseline creatinine of 1.2 - 1.3 and lack of an increased erythropoietin level suggested a component of chronic kidney disease, which would not be surprising given her history of hypertension and arterosclerotic disease, and 3) Although not severely elevated, the high normal ESR and history of rheumatoid arthritis may be contributing via an additional component of chronic inflammation. At that point, the most readily reversible issue, the borderline iron stores, were treated with a new Rx for ferrous sulfate and today, over two and one half years later, they remain much improved, adequate and stable (ferritin of 54.0 ng/mL). She has actually not been taking ferrous sulfate for well over one and one half years now. Meanwhile, her HgB remains borderline low at worst (it is actually WNL again today: 12.0 g/dL) and stable. We will continue to help monitor her on a routine basis (still [id number] for now, as we have been doing) with repeat labs and clinic visits. The patient was in agreement with this plan.    It is a pleasure to participate in Ms. Hernandez's care.  Please do not hesitate to call with any questions or concerns that you may have.    A total of 20 minutes were spent coordinating this patient’s care in clinic today; 15 minutes of which were  face-to-face with the patient, reviewing her interim medical history, discussing the results of today's labs and counseling on the current followup plan.  All questions were answered to her satisfaction.    FOLLOW UP  Return to clinic in 6 months with a CBC, CMP, iron panel and ferritin level.        This document was electronically signed by MELISSA Yap MD July 26, 2017 1:35 PM      CC: BAYRON Nielsen

## 2017-09-12 ENCOUNTER — OFFICE VISIT (OUTPATIENT)
Dept: UROLOGY | Facility: CLINIC | Age: 67
End: 2017-09-12

## 2017-09-12 DIAGNOSIS — N39.0 URINARY TRACT INFECTION WITH HEMATURIA, SITE UNSPECIFIED: Primary | ICD-10-CM

## 2017-09-12 DIAGNOSIS — N18.2 CHRONIC KIDNEY DISEASE, STAGE II (MILD): ICD-10-CM

## 2017-09-12 DIAGNOSIS — R31.9 URINARY TRACT INFECTION WITH HEMATURIA, SITE UNSPECIFIED: Primary | ICD-10-CM

## 2017-09-12 LAB
BILIRUB BLD-MCNC: NEGATIVE MG/DL
CLARITY, POC: CLEAR
COLOR UR: YELLOW
GLUCOSE UR STRIP-MCNC: NEGATIVE MG/DL
KETONES UR QL: NEGATIVE
LEUKOCYTE EST, POC: NEGATIVE
NITRITE UR-MCNC: NEGATIVE MG/ML
PH UR: 6 [PH] (ref 5–8)
PROT UR STRIP-MCNC: NEGATIVE MG/DL
RBC # UR STRIP: ABNORMAL /UL
SP GR UR: 1.01 (ref 1–1.03)
UROBILINOGEN UR QL: NORMAL

## 2017-09-12 PROCEDURE — 99213 OFFICE O/P EST LOW 20 MIN: CPT | Performed by: UROLOGY

## 2017-09-12 RX ORDER — NITROFURANTOIN 25; 75 MG/1; MG/1
100 CAPSULE ORAL DAILY
Qty: 20 CAPSULE | Refills: 0 | Status: SHIPPED | OUTPATIENT
Start: 2017-09-12

## 2017-09-12 NOTE — PROGRESS NOTES
Chief Complaint:          Chief Complaint   Patient presents with   • Urinary Tract Infection       HPI:   66 y.o. female.  66-year-old black female from [facility] who has known stage II chronic kidney disease recurrent urinary tract infections currently today complains of suprapubic pain, frequency, urgency, urge related incontinence, there is no dysuria.  She has no pain.  She has no fevers or chills.  She is very constipated.  She sees Dr. Cleary is on the litany of pain medication.  As the most likely etiology of her recurrent constipation her urinalysis was essentially negative but she does well with treatment, going to initiate therapy after discussion with Macrobid which is working very well the past.  She does not need a repeat BMP because her chronic kidney disease is been quite stable and followed by Dr. Cleary.Recurrent urinary tract infections-patient has been referred and diagnosed with recurrent urinary tract infections.  We discussed the types of organisms that are found in the urinary tract indicating that the vast majority are results of the patient's own gastrointestinal mimi.  We discussed how many of the antibiotics that are utilized can actually exacerbate these infections by creating resistant organisms and there is only a very few antibiotics that are concentrated in the urine and do not affect the rectal reservoir nor cause recurrent yeast vaginitis.  We discussed the risk factors for recurrent infections being intercourse in younger patients and atrophic changes in older patients.  We discussed the symptoms that are found including pain, pressure, burning, frequency, urgency suprapubic pain and painful intercourse.  I discussed upper tract symptoms including fevers, chills, and indicated the workup would be much more aggressive if the patient were to present with recurrent infections in the face of upper tract symptomatology such as fever.  I discussed the history of vesicoureteral  reflux in young patients and finally chronic renal scarring as a result of such.  I recommend concomitant probiotics with treatment with antibiotics to protect the rectal reservoir including over-the-counter yogurt preparations to gisele oral pills containing the appropriate probiotics.        Past Medical History:        Past Medical History:   Diagnosis Date   • Anemia    • CAD (coronary artery disease)    • Chronic kidney disease    • Hypertension    • Rheumatoid arthritis    • Sleep apnea          Current Meds:     Current Outpatient Prescriptions   Medication Sig Dispense Refill   • Acetaminophen (TYLENOL) 325 MG capsule Take 650 mg by mouth Every 8 (Eight) Hours As Needed.     • alendronate (FOSAMAX) 70 MG tablet Every 7 (Seven) Days.     • ASPIR-LOW 81 MG EC tablet      • CARTIA  MG 24 hr capsule Take 240 mg by mouth Daily.     • cetirizine (ZyrTEC) 10 MG tablet Take 10 mg by mouth Daily.     • clonazePAM (KlonoPIN) 1 MG tablet 1 mg 2 (Two) Times a Day As Needed.     • clopidogrel (PLAVIX) 75 MG tablet      • cyclobenzaprine (FLEXERIL) 10 MG tablet Take 10 mg by mouth 3 (Three) Times a Day As Needed.  1   • fluticasone (FLONASE) 50 MCG/ACT nasal spray 2 sprays into each nostril Daily.     • gabapentin (NEURONTIN) 400 MG capsule Take 400 mg by mouth 3 (Three) Times a Day.     • HYDROcodone-acetaminophen (NORCO) 7.5-325 MG per tablet 1 tablet 2 (Two) Times a Day.     • lisinopril (PRINIVIL,ZESTRIL) 10 MG tablet Take 10 mg by mouth Daily.     • metoclopramide (REGLAN) 10 MG tablet Take 10 mg by mouth 4 (Four) Times a Day.  1   • nitroglycerin (NITRODUR) 0.6 MG/HR patch      • NITROSTAT 0.4 MG SL tablet      • oxybutynin (DITROPAN) 5 MG tablet Take 5 mg by mouth 2 (Two) Times a Day.  2   • pravastatin (PRAVACHOL) 40 MG tablet 40 mg Daily.     • promethazine (PHENERGAN) 25 MG tablet Take 25 mg by mouth Every 6 (Six) Hours As Needed.     • SIMPONI 50 MG/0.5ML solution auto-injector Inject 1 dose into the  shoulder, thigh, or buttocks Every 30 (Thirty) Days.     • simvastatin (ZOCOR) 20 MG tablet Take 20 mg by mouth Daily.     • VOLTAREN 1 % gel gel Apply 4 g topically 2 (Two) Times a Day.       No current facility-administered medications for this visit.         Allergies:      Allergies   Allergen Reactions   • Arthritis Pain Regimen [Aspirin] Hives and Nausea And Vomiting     All oral arthritis medications   • Sulfa Antibiotics         Past Surgical History:     Past Surgical History:   Procedure Laterality Date   • CARDIAC CATHETERIZATION     • CARDIAC CATHETERIZATION N/A 5/2/2017    Procedure: Left Heart Cath;  Surgeon: Dickson Lea MD;  Location: [facility] CATH INVASIVE LOCATION;  Service:    • HERNIA REPAIR      1972   • TUBAL ABDOMINAL LIGATION      1972         Social History:     Social History     Social History   • Marital status:      Spouse name: N/A   • Number of children: N/A   • Years of education: N/A     Occupational History   • Not on file.     Social History Main Topics   • Smoking status: Former Smoker     Packs/day: 3.00     Years: 31.00     Quit date: 2001   • Smokeless tobacco: Never Used   • Alcohol use No   • Drug use: No   • Sexual activity: Defer     Other Topics Concern   • Not on file     Social History Narrative       Family History:     Family History   Problem Relation Age of Onset   • Cancer Other      breast   • Lymphoma Other    • Aneurysm Father    • Cancer Sister    • Clotting disorder Sister    • Aneurysm Brother        Review of Systems:     Review of Systems   Constitutional: Negative.  Negative for activity change, appetite change, chills, diaphoresis, fatigue and unexpected weight change.   HENT: Negative for congestion, dental problem, drooling, ear discharge, ear pain, facial swelling, hearing loss, mouth sores, nosebleeds, postnasal drip, rhinorrhea, sinus pressure, sneezing, sore throat, tinnitus, trouble swallowing and voice change.    Eyes: Negative.   Negative for photophobia, pain, discharge, redness, itching and visual disturbance.   Respiratory: Negative.  Negative for apnea, cough, choking, chest tightness, shortness of breath, wheezing and stridor.    Cardiovascular: Negative.  Negative for chest pain, palpitations and leg swelling.   Gastrointestinal: Negative.  Negative for abdominal distention, abdominal pain, anal bleeding, blood in stool, constipation, diarrhea, nausea, rectal pain and vomiting.   Endocrine: Negative.  Negative for cold intolerance, heat intolerance, polydipsia, polyphagia and polyuria.   Genitourinary: Positive for dysuria, flank pain, frequency and urgency.   Musculoskeletal: Negative for arthralgias, back pain, gait problem, joint swelling, myalgias, neck pain and neck stiffness.   Skin: Negative.  Negative for color change, pallor, rash and wound.   Allergic/Immunologic: Negative.  Negative for environmental allergies, food allergies and immunocompromised state.   Neurological: Negative.  Negative for dizziness, tremors, seizures, syncope, facial asymmetry, speech difficulty, weakness, light-headedness, numbness and headaches.   Hematological: Negative.  Negative for adenopathy. Does not bruise/bleed easily.   Psychiatric/Behavioral: Negative for agitation, behavioral problems, confusion, decreased concentration, dysphoric mood, hallucinations, self-injury, sleep disturbance and suicidal ideas. The patient is not nervous/anxious and is not hyperactive.    All other systems reviewed and are negative.      Physical Exam:     Physical Exam   Constitutional: She appears well-developed and well-nourished.   HENT:   Head: Normocephalic and atraumatic.   Right Ear: External ear normal.   Left Ear: External ear normal.   Mouth/Throat: Oropharynx is clear and moist.   Eyes: Conjunctivae are normal. Pupils are equal, round, and reactive to light.   Cardiovascular: Normal rate, regular rhythm, normal heart sounds and intact distal pulses.     Pulmonary/Chest: Effort normal and breath sounds normal.   Abdominal: Soft. Bowel sounds are normal. She exhibits no distension and no mass. There is no tenderness. There is no rebound and no guarding.   Genitourinary: Vagina normal. No vaginal discharge found.   Genitourinary Comments: Soft nontender abdomen with no organomegaly, rigidity, guarding or tenderness.  Normal vaginal orifice.  No evidence of prolapse no palpable masses.  No significant perineal body abnormalities and a normal external anus.  Neurologic exam is nonfocal.   Musculoskeletal: Normal range of motion.   Neurological: She is alert. She has normal reflexes.   Skin: Skin is warm and dry.   Psychiatric: She has a normal mood and affect. Her behavior is normal. Judgment and thought content normal.       Procedure:       Assessment:   No diagnosis found.  No orders of the defined types were placed in this encounter.      Plan:   Recurrent urinary tract infections-patient has been referred and diagnosed with recurrent urinary tract infections.  We discussed the types of organisms that are found in the urinary tract indicating that the vast majority are results of the patient's own gastrointestinal mimi.  We discussed how many of the antibiotics that are utilized can actually exacerbate these infections by creating resistant organisms and there is only a very few antibiotics that are concentrated in the urine and do not affect the rectal reservoir nor cause recurrent yeast vaginitis.  We discussed the risk factors for recurrent infections being intercourse in younger patients and atrophic changes in older patients.  We discussed the symptoms that are found including pain, pressure, burning, frequency, urgency suprapubic pain and painful intercourse.  I discussed upper tract symptoms including fevers, chills, and indicated the workup would be much more aggressive if the patient were to present with recurrent infections in the face of upper tract  symptomatology such as fever.  I discussed the history of vesicoureteral reflux in young patients and finally chronic renal scarring as a result of such.  I recommend concomitant probiotics with treatment with antibiotics to protect the rectal reservoir including over-the-counter yogurt preparations to gisele oral pills containing the appropriate probiotics.  He was started empirically on Macrodantin I'll follow-up with her based on this  H2 chronic kidney disease           This document has been electronically signed by SLADE ROGER MD September 12, 2017 12:53 PM

## 2017-09-13 PROBLEM — N39.0 URINARY TRACT INFECTION WITH HEMATURIA: Status: ACTIVE | Noted: 2017-09-13

## 2017-09-13 PROBLEM — N18.2 CHRONIC KIDNEY DISEASE, STAGE II (MILD): Status: ACTIVE | Noted: 2017-09-13

## 2017-09-13 PROBLEM — R31.9 URINARY TRACT INFECTION WITH HEMATURIA: Status: ACTIVE | Noted: 2017-09-13

## 2017-10-26 ENCOUNTER — OFFICE VISIT (OUTPATIENT)
Dept: UROLOGY | Facility: CLINIC | Age: 67
End: 2017-10-26

## 2017-10-26 DIAGNOSIS — N18.2 CHRONIC KIDNEY DISEASE, STAGE II (MILD): Primary | ICD-10-CM

## 2017-10-26 DIAGNOSIS — N39.0 URINARY TRACT INFECTION WITH HEMATURIA, SITE UNSPECIFIED: ICD-10-CM

## 2017-10-26 DIAGNOSIS — R31.9 URINARY TRACT INFECTION WITH HEMATURIA, SITE UNSPECIFIED: ICD-10-CM

## 2017-10-26 PROCEDURE — 99213 OFFICE O/P EST LOW 20 MIN: CPT | Performed by: UROLOGY

## 2017-10-26 NOTE — PROGRESS NOTES
Chief Complaint:          Chief Complaint   Patient presents with   • Urinary Tract Infection       HPI:   66 y.o. female.  66-year-old black female from [facility] who has known stage II chronic kidney disease recurrent urinary tract infections currently today complains of suprapubic pain, frequency, urgency, urge related incontinence, there is no dysuria.  She has no pain.  She has no fevers or chills.  She is very constipated.  She sees Dr. Cleary is on the litany of pain medication.  As the most likely etiology of her recurrent constipation her urinalysis was essentially negative but she does well with treatment, going to initiate therapy after discussion with Macrobid which is working very well the past.  She does not need a repeat BMP because her chronic kidney disease is been quite stable and followed by Dr. Cleary.Recurrent urinary tract infections-patient has been referred and diagnosed with recurrent urinary tract infections.  We discussed the types of organisms that are found in the urinary tract indicating that the vast majority are results of the patient's own gastrointestinal mimi.  We discussed how many of the antibiotics that are utilized can actually exacerbate these infections by creating resistant organisms and there is only a very few antibiotics that are concentrated in the urine and do not affect the rectal reservoir nor cause recurrent yeast vaginitis.  We discussed the risk factors for recurrent infections being intercourse in younger patients and atrophic changes in older patients.  We discussed the symptoms that are found including pain, pressure, burning, frequency, urgency suprapubic pain and painful intercourse.  I discussed upper tract symptoms including fevers, chills, and indicated the workup would be much more aggressive if the patient were to present with recurrent infections in the face of upper tract symptomatology such as fever.  I discussed the history of vesicoureteral  reflux in young patients and finally chronic renal scarring as a result of such.  I recommend concomitant probiotics with treatment with antibiotics to protect the rectal reservoir including over-the-counter yogurt preparations to gisele oral pills containing the appropriate probiotics.      She returns she is completely asymptomatic his absolute no burning blood dysuria she sees Dr. Cleary she has low back pain she has severe constipation I see her back in 6 months pending any additional recurrent urinary tract infections        Past Medical History:        Past Medical History:   Diagnosis Date   • Anemia    • CAD (coronary artery disease)    • Chronic kidney disease    • Hypertension    • Rheumatoid arthritis    • Sleep apnea          Current Meds:     Current Outpatient Prescriptions   Medication Sig Dispense Refill   • Acetaminophen (TYLENOL) 325 MG capsule Take 650 mg by mouth Every 8 (Eight) Hours As Needed.     • alendronate (FOSAMAX) 70 MG tablet Every 7 (Seven) Days.     • ASPIR-LOW 81 MG EC tablet      • CARTIA  MG 24 hr capsule Take 240 mg by mouth Daily.     • cetirizine (ZyrTEC) 10 MG tablet Take 10 mg by mouth Daily.     • clonazePAM (KlonoPIN) 1 MG tablet 1 mg 2 (Two) Times a Day As Needed.     • clopidogrel (PLAVIX) 75 MG tablet      • cyclobenzaprine (FLEXERIL) 10 MG tablet Take 10 mg by mouth 3 (Three) Times a Day As Needed.  1   • fluticasone (FLONASE) 50 MCG/ACT nasal spray 2 sprays into each nostril Daily.     • gabapentin (NEURONTIN) 400 MG capsule Take 400 mg by mouth 3 (Three) Times a Day.     • HYDROcodone-acetaminophen (NORCO) 7.5-325 MG per tablet 1 tablet 2 (Two) Times a Day.     • lisinopril (PRINIVIL,ZESTRIL) 10 MG tablet Take 10 mg by mouth Daily.     • metoclopramide (REGLAN) 10 MG tablet Take 10 mg by mouth 4 (Four) Times a Day.  1   • nitrofurantoin, macrocrystal-monohydrate, (MACROBID) 100 MG capsule Take 1 capsule by mouth Daily. 20 capsule 0   • nitroglycerin (NITRODUR) 0.6  MG/HR patch      • NITROSTAT 0.4 MG SL tablet      • oxybutynin (DITROPAN) 5 MG tablet Take 5 mg by mouth 2 (Two) Times a Day.  2   • pravastatin (PRAVACHOL) 40 MG tablet 40 mg Daily.     • promethazine (PHENERGAN) 25 MG tablet Take 25 mg by mouth Every 6 (Six) Hours As Needed.     • SIMPONI 50 MG/0.5ML solution auto-injector Inject 1 dose into the shoulder, thigh, or buttocks Every 30 (Thirty) Days.     • simvastatin (ZOCOR) 20 MG tablet Take 20 mg by mouth Daily.     • VOLTAREN 1 % gel gel Apply 4 g topically 2 (Two) Times a Day.       No current facility-administered medications for this visit.         Allergies:      Allergies   Allergen Reactions   • Arthritis Pain Regimen [Aspirin] Hives and Nausea And Vomiting     All oral arthritis medications   • Sulfa Antibiotics         Past Surgical History:     Past Surgical History:   Procedure Laterality Date   • CARDIAC CATHETERIZATION     • CARDIAC CATHETERIZATION N/A 5/2/2017    Procedure: Left Heart Cath;  Surgeon: Dickson eLa MD;  [facility];  Service:    • HERNIA REPAIR      1972   • TUBAL ABDOMINAL LIGATION      1972         Social History:     Social History     Social History   • Marital status:      Spouse name: N/A   • Number of children: N/A   • Years of education: N/A     Occupational History   • Not on file.     Social History Main Topics   • Smoking status: Former Smoker     Packs/day: 3.00     Years: 31.00     Quit date: 2001   • Smokeless tobacco: Never Used   • Alcohol use No   • Drug use: No   • Sexual activity: Defer     Other Topics Concern   • Not on file     Social History Narrative       Family History:     Family History   Problem Relation Age of Onset   • Cancer Other      breast   • Lymphoma Other    • Aneurysm Father    • Cancer Sister    • Clotting disorder Sister    • Aneurysm Brother        Review of Systems:     Review of Systems   Constitutional: Negative.  Negative for activity change,  appetite change, chills, diaphoresis, fatigue and unexpected weight change.   HENT: Negative for congestion, dental problem, drooling, ear discharge, ear pain, facial swelling, hearing loss, mouth sores, nosebleeds, postnasal drip, rhinorrhea, sinus pressure, sneezing, sore throat, tinnitus, trouble swallowing and voice change.    Eyes: Negative.  Negative for photophobia, pain, discharge, redness, itching and visual disturbance.   Respiratory: Negative.  Negative for apnea, cough, choking, chest tightness, shortness of breath, wheezing and stridor.    Cardiovascular: Negative.  Negative for chest pain, palpitations and leg swelling.   Gastrointestinal: Negative.  Negative for abdominal distention, abdominal pain, anal bleeding, blood in stool, constipation, diarrhea, nausea, rectal pain and vomiting.   Endocrine: Negative.  Negative for cold intolerance, heat intolerance, polydipsia, polyphagia and polyuria.   Musculoskeletal: Negative.  Negative for arthralgias, back pain, gait problem, joint swelling, myalgias, neck pain and neck stiffness.   Skin: Negative.  Negative for color change, pallor, rash and wound.   Allergic/Immunologic: Negative.  Negative for environmental allergies, food allergies and immunocompromised state.   Neurological: Negative.  Negative for dizziness, tremors, seizures, syncope, facial asymmetry, speech difficulty, weakness, light-headedness, numbness and headaches.   Hematological: Negative.  Negative for adenopathy. Does not bruise/bleed easily.   Psychiatric/Behavioral: Negative for agitation, behavioral problems, confusion, decreased concentration, dysphoric mood, hallucinations, self-injury, sleep disturbance and suicidal ideas. The patient is not nervous/anxious and is not hyperactive.    All other systems reviewed and are negative.      Physical Exam:     Physical Exam   Constitutional: She appears well-developed and well-nourished.   HENT:   Head: Normocephalic and atraumatic.    Right Ear: External ear normal.   Left Ear: External ear normal.   Mouth/Throat: Oropharynx is clear and moist.   Eyes: Conjunctivae are normal. Pupils are equal, round, and reactive to light.   Cardiovascular: Normal rate, regular rhythm, normal heart sounds and intact distal pulses.    Pulmonary/Chest: Effort normal and breath sounds normal.   Abdominal: Soft. Bowel sounds are normal. She exhibits no distension and no mass. There is no tenderness. There is no rebound and no guarding.   Genitourinary: Vagina normal. No vaginal discharge found.   Musculoskeletal: Normal range of motion.   Neurological: She is alert. She has normal reflexes.   Skin: Skin is warm and dry.   Psychiatric: She has a normal mood and affect. Her behavior is normal. Judgment and thought content normal.       Procedure:       Assessment:   No diagnosis found.  No orders of the defined types were placed in this encounter.      Plan:   Recurrent urinary tract infections-patient has been referred and diagnosed with recurrent urinary tract infections.  We discussed the types of organisms that are found in the urinary tract indicating that the vast majority are results of the patient's own gastrointestinal mimi.  We discussed how many of the antibiotics that are utilized can actually exacerbate these infections by creating resistant organisms and there is only a very few antibiotics that are concentrated in the urine and do not affect the rectal reservoir nor cause recurrent yeast vaginitis.  We discussed the risk factors for recurrent infections being intercourse in younger patients and atrophic changes in older patients.  We discussed the symptoms that are found including pain, pressure, burning, frequency, urgency suprapubic pain and painful intercourse.  I discussed upper tract symptoms including fevers, chills, and indicated the workup would be much more aggressive if the patient were to present with recurrent infections in the face of  upper tract symptomatology such as fever.  I discussed the history of vesicoureteral reflux in young patients and finally chronic renal scarring as a result of such.  I recommend concomitant probiotics with treatment with antibiotics to protect the rectal reservoir including over-the-counter yogurt preparations to gisele oral pills containing the appropriate probiotics.  It has completely resolved all see her back in 6 months           This document has been electronically signed by SLADE ROGER MD October 26, 2017 1:17 PM

## 2017-12-08 DIAGNOSIS — R35.0 URINARY FREQUENCY: Primary | ICD-10-CM

## 2017-12-08 RX ORDER — OXYBUTYNIN CHLORIDE 5 MG/1
TABLET ORAL
Qty: 30 TABLET | Refills: 11 | Status: SHIPPED | OUTPATIENT
Start: 2017-12-08

## 2018-01-25 ENCOUNTER — OFFICE VISIT (OUTPATIENT)
Dept: ONCOLOGY | Facility: CLINIC | Age: 68
End: 2018-01-25

## 2018-01-25 ENCOUNTER — LAB (OUTPATIENT)
Dept: ONCOLOGY | Facility: CLINIC | Age: 68
End: 2018-01-25

## 2018-01-25 VITALS
DIASTOLIC BLOOD PRESSURE: 87 MMHG | RESPIRATION RATE: 18 BRPM | SYSTOLIC BLOOD PRESSURE: 137 MMHG | TEMPERATURE: 98.1 F | WEIGHT: 131.1 LBS | OXYGEN SATURATION: 100 % | HEART RATE: 74 BPM | BODY MASS INDEX: 24.77 KG/M2

## 2018-01-25 DIAGNOSIS — D64.9 ANEMIA, UNSPECIFIED TYPE: ICD-10-CM

## 2018-01-25 DIAGNOSIS — D64.9 ANEMIA, UNSPECIFIED TYPE: Primary | ICD-10-CM

## 2018-01-25 LAB
ALBUMIN SERPL-MCNC: 4.3 G/DL (ref 3.4–4.8)
ALBUMIN/GLOB SERPL: 1.4 G/DL (ref 1.5–2.5)
ALP SERPL-CCNC: 71 U/L (ref 35–104)
ALT SERPL W P-5'-P-CCNC: 16 U/L (ref 10–36)
ANION GAP SERPL CALCULATED.3IONS-SCNC: 5.3 MMOL/L (ref 3.6–11.2)
AST SERPL-CCNC: 21 U/L (ref 10–30)
BASOPHILS # BLD AUTO: 0.03 10*3/MM3 (ref 0–0.3)
BASOPHILS NFR BLD AUTO: 0.5 % (ref 0–2)
BILIRUB SERPL-MCNC: 0.3 MG/DL (ref 0.2–1.8)
BUN BLD-MCNC: 29 MG/DL (ref 7–21)
BUN/CREAT SERPL: 27.9 (ref 7–25)
CALCIUM SPEC-SCNC: 9.6 MG/DL (ref 7.7–10)
CHLORIDE SERPL-SCNC: 111 MMOL/L (ref 99–112)
CO2 SERPL-SCNC: 24.7 MMOL/L (ref 24.3–31.9)
CREAT BLD-MCNC: 1.04 MG/DL (ref 0.43–1.29)
DEPRECATED RDW RBC AUTO: 43.7 FL (ref 37–54)
EOSINOPHIL # BLD AUTO: 0.09 10*3/MM3 (ref 0–0.7)
EOSINOPHIL NFR BLD AUTO: 1.5 % (ref 0–7)
ERYTHROCYTE [DISTWIDTH] IN BLOOD BY AUTOMATED COUNT: 14 % (ref 11.5–14.5)
FERRITIN SERPL-MCNC: 52 NG/ML (ref 10–290.3)
GFR SERPL CREATININE-BSD FRML MDRD: 64 ML/MIN/1.73
GLOBULIN UR ELPH-MCNC: 3 GM/DL
GLUCOSE BLD-MCNC: 85 MG/DL (ref 70–110)
HCT VFR BLD AUTO: 34.2 % (ref 37–47)
HGB BLD-MCNC: 11.1 G/DL (ref 12–16)
IMM GRANULOCYTES # BLD: 0 10*3/MM3 (ref 0–0.03)
IMM GRANULOCYTES NFR BLD: 0 % (ref 0–0.5)
IRON 24H UR-MRATE: 75 MCG/DL (ref 49–151)
IRON SATN MFR SERPL: 22 % (ref 15–50)
LYMPHOCYTES # BLD AUTO: 2.59 10*3/MM3 (ref 1–3)
LYMPHOCYTES NFR BLD AUTO: 44.3 % (ref 16–46)
MCH RBC QN AUTO: 28.4 PG (ref 27–33)
MCHC RBC AUTO-ENTMCNC: 32.5 G/DL (ref 33–37)
MCV RBC AUTO: 87.5 FL (ref 80–94)
MONOCYTES # BLD AUTO: 0.61 10*3/MM3 (ref 0.1–0.9)
MONOCYTES NFR BLD AUTO: 10.4 % (ref 0–12)
NEUTROPHILS # BLD AUTO: 2.53 10*3/MM3 (ref 1.4–6.5)
NEUTROPHILS NFR BLD AUTO: 43.3 % (ref 40–75)
OSMOLALITY SERPL CALC.SUM OF ELEC: 286.3 MOSM/KG (ref 273–305)
PLATELET # BLD AUTO: 251 10*3/MM3 (ref 130–400)
PMV BLD AUTO: 10.1 FL (ref 6–10)
POTASSIUM BLD-SCNC: 3.9 MMOL/L (ref 3.5–5.3)
PROT SERPL-MCNC: 7.3 G/DL (ref 6–8)
RBC # BLD AUTO: 3.91 10*6/MM3 (ref 4.2–5.4)
SODIUM BLD-SCNC: 141 MMOL/L (ref 135–153)
TIBC SERPL-MCNC: 344 MCG/DL (ref 241–421)
WBC NRBC COR # BLD: 5.85 10*3/MM3 (ref 4.5–12.5)

## 2018-01-25 PROCEDURE — 83550 IRON BINDING TEST: CPT | Performed by: INTERNAL MEDICINE

## 2018-01-25 PROCEDURE — 83540 ASSAY OF IRON: CPT | Performed by: INTERNAL MEDICINE

## 2018-01-25 PROCEDURE — 82728 ASSAY OF FERRITIN: CPT | Performed by: INTERNAL MEDICINE

## 2018-01-25 PROCEDURE — 99213 OFFICE O/P EST LOW 20 MIN: CPT | Performed by: INTERNAL MEDICINE

## 2018-01-25 PROCEDURE — 85025 COMPLETE CBC W/AUTO DIFF WBC: CPT | Performed by: INTERNAL MEDICINE

## 2018-01-25 PROCEDURE — 80053 COMPREHEN METABOLIC PANEL: CPT | Performed by: INTERNAL MEDICINE

## 2018-01-25 NOTE — PROGRESS NOTES
Name:  Gemma Hernandez  :  1950  Date:  2018     REFERRING PROVIDER  BAYRON Nielsen    PRIMARY CARE PROVIDER  Zaira Cleary MD    REASON FOR FOLLOWUP  1. Anemia, unspecified type      CHIEF COMPLAINT  Chronic fatigue, stable.    Dear Ms. Sibley,    HISTORY OF PRESENT ILLNESS:   I saw Ms. Hernandez in follow up today in our medical oncology clinic. As you are aware, she is a pleasant, 67 y.o.,  female with a history of rheumatoid arthritis and coronary artery disease who, in late summer 2014, was  also been noted to be borderline anemic. She was admitted to [facility] (in early 2014) after presenting to their ED with weakness and black stools. She has been on Plavix and ASA due to a history of stent placement; but, according to her, several repeated endoscopies (both upper and lower) over the years (including during  her most recent hospitalization ) have been largely unremarkable (per the discharge summary, mainly gastritis only). She was referred to our clinic for further outpatient evaluation regarding her anemia. At the time of her initial appointment with us (2014), she confirmed that she receives monthly injections with golimumab for her rheumatoid arthri tis (which she believes helps quite a bit). She also reported intermittent rectal bleeding (and, as mentioned above, was admitted for it in early 2014). A number of basic anemia st udies were ordered at that time, and it was ultimately felt that, while chronic inflammation (from her arthritis) and chronic kidney disease (likely from longstanding hypertension;  her creatinine is in the 1.2 - 1.3 range, and her erythropoietin level was not appropriately elevated) were contributing, iron deficiency was playing an increasing role (her ferritin level had decreased, from 32 to 17). She was started on, at most, maintenance ferrous sulfate and has been doing overall well in follow up ever since,  with improved (and now stable) iron stores and a stable, borderline low to low normal hemoglobin.     INTERIM HISTORY:  Ms. Hernandez returns to clinic today for follow up.  She has been off of ferrous sulfate completely for about two years now  She is back to her usual, laxative-dependent, chronic constipation (with no noticeable, repeat GI bleeds recently). She otherwise continues to feel overall well and, other than chronic, but stable, fatigue, continues to have no new or specific complaints.    Past Medical History:   Diagnosis Date   • Anemia    • CAD (coronary artery disease)    • Chronic kidney disease    • Hypertension    • Rheumatoid arthritis    • Sleep apnea        Past Surgical History:   Procedure Laterality Date   • CARDIAC CATHETERIZATION     • CARDIAC CATHETERIZATION N/A 5/2/2017    Procedure: Left Heart Cath;  Surgeon: Dickson Lea MD;  Location: [facility] INVASIVE LOCATION;  Service:    • HERNIA REPAIR      1972   • TUBAL ABDOMINAL LIGATION      1972       Social History     Social History   • Marital status:      Spouse name: N/A   • Number of children: N/A   • Years of education: N/A     Occupational History   • Not on file.     Social History Main Topics   • Smoking status: Former Smoker     Packs/day: 3.00     Years: 31.00     Quit date: 2001   • Smokeless tobacco: Never Used   • Alcohol use No   • Drug use: No   • Sexual activity: Defer     Other Topics Concern   • Not on file     Social History Narrative       Family History   Problem Relation Age of Onset   • Cancer Other      breast   • Lymphoma Other    • Aneurysm Father    • Cancer Sister    • Clotting disorder Sister    • Aneurysm Brother        Allergies   Allergen Reactions   • Arthritis Pain Regimen [Aspirin] Hives and Nausea And Vomiting     All oral arthritis medications   • Sulfa Antibiotics        Current Outpatient Prescriptions   Medication Sig Dispense Refill   • Acetaminophen (TYLENOL) 325 MG capsule Take 650  mg by mouth Every 8 (Eight) Hours As Needed.     • alendronate (FOSAMAX) 70 MG tablet Every 7 (Seven) Days.     • ASPIR-LOW 81 MG EC tablet      • CARTIA  MG 24 hr capsule Take 240 mg by mouth Daily.     • cetirizine (ZyrTEC) 10 MG tablet Take 10 mg by mouth Daily.     • clonazePAM (KlonoPIN) 1 MG tablet 1 mg 2 (Two) Times a Day As Needed.     • clopidogrel (PLAVIX) 75 MG tablet      • cyclobenzaprine (FLEXERIL) 10 MG tablet Take 10 mg by mouth 3 (Three) Times a Day As Needed.  1   • fluticasone (FLONASE) 50 MCG/ACT nasal spray 2 sprays into each nostril Daily.     • gabapentin (NEURONTIN) 400 MG capsule Take 400 mg by mouth 3 (Three) Times a Day.     • HYDROcodone-acetaminophen (NORCO) 7.5-325 MG per tablet 1 tablet 2 (Two) Times a Day.     • lisinopril (PRINIVIL,ZESTRIL) 10 MG tablet Take 10 mg by mouth Daily.     • metoclopramide (REGLAN) 10 MG tablet Take 10 mg by mouth 4 (Four) Times a Day.  1   • nitrofurantoin, macrocrystal-monohydrate, (MACROBID) 100 MG capsule Take 1 capsule by mouth Daily. 20 capsule 0   • nitroglycerin (NITRODUR) 0.6 MG/HR patch      • NITROSTAT 0.4 MG SL tablet      • oxybutynin (DITROPAN) 5 MG tablet TAKE ONE TABLET BY MOUTH EVERY NIGHT AT BEDTIME 30 tablet 11   • pravastatin (PRAVACHOL) 40 MG tablet 40 mg Daily.     • promethazine (PHENERGAN) 25 MG tablet Take 25 mg by mouth Every 6 (Six) Hours As Needed.     • SIMPONI 50 MG/0.5ML solution auto-injector Inject 1 dose into the shoulder, thigh, or buttocks Every 30 (Thirty) Days.     • simvastatin (ZOCOR) 20 MG tablet Take 20 mg by mouth Daily.     • VOLTAREN 1 % gel gel Apply 4 g topically 2 (Two) Times a Day.       No current facility-administered medications for this visit.        REVIEW OF SYSTEMS  CONSTITUTIONAL:  No fever, chills or night sweats. Chronic fatigue, stable.  EYES:  No blurry vision, diplopia or other vision changes.  ENT:  No hearing loss, nosebleeds or sore throat.  CARDIOVASCULAR:  No palpitations, arrhythmia,  syncopal episodes or edema.  PULMONARY:  No hemoptysis, wheezing, chronic cough or shortness of breath.  GASTROINTESTINAL:  No nausea or vomiting.  No abdominal pain. Chronic constipation, stable and still manageable with prn laxatives.  GENITOURINARY:  No hematuria, kidney stones or frequent urination.  MUSCULOSKELETAL:  No joint or back pains.  INTEGUMENTARY: No rashes or pruritus.  ENDOCRINE:  No excessive thirst or hot flashes.  HEMATOLOGIC:  No history of free bleeding, spontaneous bleeding or clotting.  IMMUNOLOGIC:  No allergies or frequent infections.  NEUROLOGIC: No numbness, tingling, seizures or weakness.  PSYCHIATRIC:  No anxiety or depression.    PHYSICAL EXAMINATION    /87  Pulse 74  Temp 98.1 °F (36.7 °C) (Oral)   Resp 18  Wt 59.5 kg (131 lb 1.6 oz)  SpO2 100%  BMI 24.77 kg/m2    GENERAL:  A well-developed, well-nourished,  female in no acute distress.  HEENT:  Pupils equally round and reactive to light.  Extraocular muscles intact.  CARDIOVASCULAR:  Regular rate and rhythm.  No murmurs, gallops or rubs.  LUNGS:  Clear to auscultation bilaterally.  ABDOMEN:  Soft, nontender, nondistended with positive bowel sounds.  EXTREMITIES:  No clubbing, cyanosis or edema bilaterally.  SKIN:  No rashes or petechiae.  NEURO:  Cranial nerves grossly intact.  No focal deficits.  PSYCH:  Alert and oriented x3.    LABORATORY    Lab Results   Component Value Date    WBC 5.85 01/25/2018    HGB 11.1 (L) 01/25/2018    HCT 34.2 (L) 01/25/2018    MCV 87.5 01/25/2018     01/25/2018    NEUTROABS 2.53 01/25/2018       Lab Results   Component Value Date     01/25/2018    K 3.9 01/25/2018     01/25/2018    CO2 24.7 01/25/2018    BUN 29 (H) 01/25/2018    CREATININE 1.04 01/25/2018    GLUCOSE 85 01/25/2018    CALCIUM 9.6 01/25/2018    AST 21 01/25/2018    ALT 16 01/25/2018    ALKPHOS 71 01/25/2018    BILITOT 0.3 01/25/2018    PROTEINTOT 7.3 01/25/2018    ALBUMIN 4.30 01/25/2018      CBC (01/25/2018): WBCs: 5.8; HgB: 11.1; Hct: 34.2; platelets: 251; MCV: 87.5  CBC (07/26/2017): WBCs: 6.1; HgB: 12.0; Hct: 36.0; platelets: 231; MCV: 84.9  CBC (05/02/2017): WBCs: 5.8; HgB: 11.2; Hct: 35.4; platelets: 229; MCV: 89.8  CBC (01/20/2017): WBCs: 5.8; HgB: 12.2; Hct: 36.4; platelets: 217; MCV: 86.5  CBC (07/20/2016): WBCs: 5.4; HgB: 11.4; Hct: 35.0; platelets: 228; MCV: 86.8  CBC (12/28/2015): WBCs: 5.1; HgB: 11.3; Hct: 32.7; platelets: 208; MCV: 81.5    CBC (06/26/2015): WBCs: 4.6; HgB: 11.7; Hct: 36.6; platelets: 226; MCV: 85.7    CBC (03/31/2015): WBCs: 5.5; HgB: 12.6; Hct: 39.2; platelets: 246; MCV: 86.5    CBC (12/18/2014): WBCs: 4.7; HgB: 11.9; Hct: 36.2; platelets: 238; MCV: 86.0    CBC (09/30/2014): WBCs: 5.6; HgB: 10.7; Hct: 33.3; platelets: 213; MCV: 85.6    CBC (08/02/2014): WBCs: 5.2; HgB: 11.5; Hct: 36.4; platelets: 239; MCV: 86     Iron profile (01/25/2018): serum iron: 75; % saturation: 22; TIBC: 344; ferritin: 52.0 ng/mL  Iron profile (07/26/2017): serum iron: 88; % saturation: 27; TIBC: 321; ferritin: 54.0  Iron profile (01/20/2017): serum iron: 85; % saturation: 26; TIBC: 328; ferritin: 51.0  Iron profile (07/20/2016): serum iron: 60; % saturation: 18; TIBC: 327; ferritin: 40.0    Iron profile (12/28/2015): serum iron: 83; % saturation: 28; TIBC: 292; ferritin: 60.0    Iron profile (06/26/2015): serum iron: 102; % saturation: 32; TIBC: 320; ferritin: 47.0    Iron profile (03/31/2015): serum iron: 96; % saturation: 29; TIBC: 331; ferritin: 58.0    Iron profile (12/18/2014): serum iron: 55; % saturation: 21; TIBC: 263; ferritin: 34.0     Vitamin B12 (01/20/2017): 836 pg/mL; serum folate (01/20/2017): 13.70 ng/mL  Vitamin B12 (03/31/2015): 849; serum folate (03/31/2015): > 24.00    TSH (12/18/2014): 0.889; free T4: 0.87      Studies from 09/16/2014:    Iron panel: serum iron: 130; % saturation: 40; TIBC: 327; ferritin: 17.0    Vitamin B12: 538; methylmalonic acid: 155; serum folate:  18.89    Retic count: Percent: 0.8%; Absolute: 0.03    TSH: 0.747; Haptoglobin: 151    Erythropoietin, serum: 9.0      Serum ferritin (07/30/2014): 32.0      IMAGING    PATHOLOGY    IMPRESSION AND PLAN  Ms. Hernandez is a 67 y.o.,  female with:  Anemia: Multifactorial and mild, with a HgB generally in the low to mid 11 range, at worst, since at least mid-2014, and otherwise unremarkable CBCs. Based on the initial workup performed at her initial clinic visit with us in September 2014, a few issues appear to have been (and some still are) playing a role: 1) Although her serum iron has remained within normal limits, her iron stores had been on the low side; and, by fall 2014, were getting worse (ferritin decreased from 32 ng/mL in late July to 17 by September 2014), suggesting a borderline iron deficiency that was not surprising, given her history of intermittent rectal bleeding and lifelong, routine menstrual blood loss (up until the late 2000s), 2) Her baseline creatinine of 1.1 - 1.3 and lack of an increased erythropoietin level suggested a component of chronic kidney disease, which would not be surprising given her history of hypertension and arterosclerotic disease, and 3) Although not severely elevated, the high normal ESR and history of rheumatoid arthritis may be contributing via an additional component of chronic inflammation. At that point, the most readily reversible issue, the borderline iron stores, were treated with a new Rx for ferrous sulfate and today, well over three years later, they remain much improved, adequate and stable (ferritin of 52.0 ng/mL today). She has actually not been taking ferrous sulfate at all for about two years now. Meanwhile, her HgB remains borderline low at worst (11.1 g/dL today) and stable. We will continue to help monitor her on a routine basis (still [id number] for now, as we have been doing) with repeat labs and clinic visits. The patient was in agreement with  this plan.    It is a pleasure to participate in Ms. Hernandez's care. Please do not hesitate to call with any questions or concerns that you may have.    A total of 20 minutes were spent coordinating this patient’s care in clinic today; more than 50% of this time was face-to-face with the patient, reviewing her interim medical history, discussing the results of today's labs and counseling on the current followup plan. All questions were answered to her satisfaction.    FOLLOW UP  Return to clinic in 6 months with a CBC, CMP, iron panel and ferritin level.        This document was electronically signed by MELISSA Yap MD January 25, 2018 1:39 PM      CC: BAYRON Nielsen

## 2018-05-24 ENCOUNTER — OFFICE VISIT (OUTPATIENT)
Dept: UROLOGY | Facility: CLINIC | Age: 68
End: 2018-05-24

## 2018-05-24 VITALS — HEIGHT: 60 IN | WEIGHT: 131 LBS | BODY MASS INDEX: 25.72 KG/M2

## 2018-05-24 DIAGNOSIS — N18.2 CHRONIC KIDNEY DISEASE, STAGE II (MILD): Primary | ICD-10-CM

## 2018-05-24 DIAGNOSIS — N39.0 URINARY TRACT INFECTION WITH HEMATURIA, SITE UNSPECIFIED: ICD-10-CM

## 2018-05-24 DIAGNOSIS — R31.9 URINARY TRACT INFECTION WITH HEMATURIA, SITE UNSPECIFIED: ICD-10-CM

## 2018-05-24 PROCEDURE — 99213 OFFICE O/P EST LOW 20 MIN: CPT | Performed by: UROLOGY

## 2018-05-24 NOTE — PROGRESS NOTES
Chief Complaint:          Chief Complaint   Patient presents with   • Chronic Kidney Disease       HPI:   67 y.o. female.  67-year-old black female from [facility] who has known stage II chronic kidney disease recurrent urinary tract infections currently today complains of suprapubic pain, frequency, urgency, urge related incontinence, there is no dysuria.  She has no pain.  She has no fevers or chills.  She is very constipated.  She sees Dr. Cleary is on the litany of pain medication.  As the most likely etiology of her recurrent constipation her urinalysis was essentially negative but she does well with treatment, going to initiate therapy after discussion with Macrobid which is working very well the past.  She does not need a repeat BMP because her chronic kidney disease is been quite stable and followed by Dr. Cleary .Recurrent urinary tract infections-patient has been referred and diagnosed with recurrent urinary tract infections.  We discussed the types of organisms that are found in the urinary tract indicating that the vast majority are results of the patient's own gastrointestinal mimi.  We discussed how many of the antibiotics that are utilized can actually exacerbate these infections by creating resistant organisms and there is only a very few antibiotics that are concentrated in the urine and do not affect the rectal reservoir nor cause recurrent yeast vaginitis.  We discussed the risk factors for recurrent infections being intercourse in younger patients and atrophic changes in older patients.  We discussed the symptoms that are found including pain, pressure, burning, frequency, urgency suprapubic pain and painful intercourse.  I discussed upper tract symptoms including fevers, chills, and indicated the workup would be much more aggressive if the patient were to present with recurrent infections in the face of upper tract symptomatology such as fever.  I discussed the history of vesicoureteral  reflux in young patients and finally chronic renal scarring as a result of such.  I recommend concomitant probiotics with treatment with antibiotics to protect the rectal reservoir including over-the-counter yogurt preparations to gisele oral pills containing the appropriate probiotics.      She returns she is completely asymptomatic his absolute no burning blood dysuria she sees Dr. Cleary she has low back pain she has severe constipation I see her back in 6 months pending any additional recurrent urinary tract infections.  She returns she's not had treatment for UTI, she feels good, she's had no burning, blood in the urine, discharge, fevers or chills.  She has mild stage II chronic kidney disease.       Past Medical History:        Past Medical History:   Diagnosis Date   • Anemia    • CAD (coronary artery disease)    • Chronic kidney disease    • Hypertension    • Rheumatoid arthritis    • Sleep apnea          Current Meds:     Current Outpatient Prescriptions   Medication Sig Dispense Refill   • Acetaminophen (TYLENOL) 325 MG capsule Take 650 mg by mouth Every 8 (Eight) Hours As Needed.     • alendronate (FOSAMAX) 70 MG tablet Every 7 (Seven) Days.     • ASPIR-LOW 81 MG EC tablet      • CARTIA  MG 24 hr capsule Take 240 mg by mouth Daily.     • cetirizine (ZyrTEC) 10 MG tablet Take 10 mg by mouth Daily.     • clonazePAM (KlonoPIN) 1 MG tablet 1 mg 2 (Two) Times a Day As Needed.     • clopidogrel (PLAVIX) 75 MG tablet      • cyclobenzaprine (FLEXERIL) 10 MG tablet Take 10 mg by mouth 3 (Three) Times a Day As Needed.  1   • fluticasone (FLONASE) 50 MCG/ACT nasal spray 2 sprays into each nostril Daily.     • gabapentin (NEURONTIN) 400 MG capsule Take 400 mg by mouth 3 (Three) Times a Day.     • HYDROcodone-acetaminophen (NORCO) 7.5-325 MG per tablet 1 tablet 2 (Two) Times a Day.     • lisinopril (PRINIVIL,ZESTRIL) 10 MG tablet Take 10 mg by mouth Daily.     • metoclopramide (REGLAN) 10 MG tablet Take 10 mg by  mouth 4 (Four) Times a Day.  1   • nitrofurantoin, macrocrystal-monohydrate, (MACROBID) 100 MG capsule Take 1 capsule by mouth Daily. 20 capsule 0   • nitroglycerin (NITRODUR) 0.6 MG/HR patch      • NITROSTAT 0.4 MG SL tablet      • oxybutynin (DITROPAN) 5 MG tablet TAKE ONE TABLET BY MOUTH EVERY NIGHT AT BEDTIME 30 tablet 11   • pravastatin (PRAVACHOL) 40 MG tablet 40 mg Daily.     • promethazine (PHENERGAN) 25 MG tablet Take 25 mg by mouth Every 6 (Six) Hours As Needed.     • SIMPONI 50 MG/0.5ML solution auto-injector Inject 1 dose into the shoulder, thigh, or buttocks Every 30 (Thirty) Days.     • simvastatin (ZOCOR) 20 MG tablet Take 20 mg by mouth Daily.     • VOLTAREN 1 % gel gel Apply 4 g topically 2 (Two) Times a Day.       No current facility-administered medications for this visit.         Allergies:      Allergies   Allergen Reactions   • Arthritis Pain Regimen [Aspirin] Hives and Nausea And Vomiting     All oral arthritis medications   • Sulfa Antibiotics         Past Surgical History:     Past Surgical History:   Procedure Laterality Date   • CARDIAC CATHETERIZATION     • CARDIAC CATHETERIZATION N/A 5/2/2017    Procedure: Left Heart Cath;  Surgeon: Dickson Lea MD;  Location: [facility] INVASIVE LOCATION;  Service:    • HERNIA REPAIR      1972   • TUBAL ABDOMINAL LIGATION      1972         Social History:     Social History     Social History   • Marital status:      Spouse name: N/A   • Number of children: N/A   • Years of education: N/A     Occupational History   • Not on file.     Social History Main Topics   • Smoking status: Former Smoker     Packs/day: 3.00     Years: 31.00     Quit date: 2001   • Smokeless tobacco: Never Used   • Alcohol use No   • Drug use: No   • Sexual activity: Defer     Other Topics Concern   • Not on file     Social History Narrative   • No narrative on file       Family History:     Family History   Problem Relation Age of Onset   • Cancer Other          breast   • Lymphoma Other    • Aneurysm Father    • Cancer Sister    • Clotting disorder Sister    • Aneurysm Brother        Review of Systems:     Review of Systems   Constitutional: Negative.  Negative for activity change, appetite change, chills, diaphoresis, fatigue and unexpected weight change.   HENT: Negative for congestion, dental problem, drooling, ear discharge, ear pain, facial swelling, hearing loss, mouth sores, nosebleeds, postnasal drip, rhinorrhea, sinus pressure, sneezing, sore throat, tinnitus, trouble swallowing and voice change.    Eyes: Negative.  Negative for photophobia, pain, discharge, redness, itching and visual disturbance.   Respiratory: Negative.  Negative for apnea, cough, choking, chest tightness, shortness of breath, wheezing and stridor.    Cardiovascular: Negative.  Negative for chest pain, palpitations and leg swelling.   Gastrointestinal: Negative.  Negative for abdominal distention, abdominal pain, anal bleeding, blood in stool, constipation, diarrhea, nausea, rectal pain and vomiting.   Endocrine: Negative.  Negative for cold intolerance, heat intolerance, polydipsia, polyphagia and polyuria.   Musculoskeletal: Negative.  Negative for arthralgias, back pain, gait problem, joint swelling, myalgias, neck pain and neck stiffness.   Skin: Negative.  Negative for color change, pallor, rash and wound.   Allergic/Immunologic: Negative.  Negative for environmental allergies, food allergies and immunocompromised state.   Neurological: Negative.  Negative for dizziness, tremors, seizures, syncope, facial asymmetry, speech difficulty, weakness, light-headedness, numbness and headaches.   Hematological: Negative.  Negative for adenopathy. Does not bruise/bleed easily.   Psychiatric/Behavioral: Negative for agitation, behavioral problems, confusion, decreased concentration, dysphoric mood, hallucinations, self-injury, sleep disturbance and suicidal ideas. The patient is not nervous/anxious  and is not hyperactive.    All other systems reviewed and are negative.      Physical Exam:     Physical Exam   Constitutional: She appears well-developed and well-nourished.   HENT:   Head: Normocephalic and atraumatic.   Right Ear: External ear normal.   Left Ear: External ear normal.   Mouth/Throat: Oropharynx is clear and moist.   Eyes: Conjunctivae are normal. Pupils are equal, round, and reactive to light.   Cardiovascular: Normal rate, regular rhythm, normal heart sounds and intact distal pulses.    Pulmonary/Chest: Effort normal and breath sounds normal.   Abdominal: Soft. Bowel sounds are normal. She exhibits no distension and no mass. There is no tenderness. There is no rebound and no guarding.   Genitourinary: No vaginal discharge found.   Musculoskeletal: Normal range of motion.   Neurological: She is alert. She has normal reflexes.   Skin: Skin is warm and dry.   Psychiatric: She has a normal mood and affect. Her behavior is normal. Judgment and thought content normal.       I have reviewed the following portions of the patient's history: allergies, current medications, past family history, past medical history, past social history, past surgical history, problem list and ROS and confirm it's accurate.      Procedure:       Assessment/Plan:   Recurrent urinary tract infections-patient has been referred and diagnosed with recurrent urinary tract infections.  We discussed the types of organisms that are found in the urinary tract indicating that the vast majority are results of the patient's own gastrointestinal mimi.  We discussed how many of the antibiotics that are utilized can actually exacerbate these infections by creating resistant organisms and there is only a very few antibiotics that are concentrated in the urine and do not affect the rectal reservoir nor cause recurrent yeast vaginitis.  We discussed the risk factors for recurrent infections being intercourse in younger patients and atrophic  changes in older patients.  We discussed the symptoms that are found including pain, pressure, burning, frequency, urgency suprapubic pain and painful intercourse.  I discussed upper tract symptoms including fevers, chills, and indicated the workup would be much more aggressive if the patient were to present with recurrent infections in the face of upper tract symptomatology such as fever.  I discussed the history of vesicoureteral reflux in young patients and finally chronic renal scarring as a result of such.  I recommend concomitant probiotics with treatment with antibiotics to protect the rectal reservoir including over-the-counter yogurt preparations to gisele oral pills containing the appropriate probiotics.  Continues to do extremely well     Patient's Body mass index is 25.58 kg/m². BMI is within normal parameters. No follow-up required.          This document has been electronically signed by SLADE ROGER MD May 24, 2018 1:07 PM

## 2018-07-26 ENCOUNTER — OFFICE VISIT (OUTPATIENT)
Dept: ONCOLOGY | Facility: CLINIC | Age: 68
End: 2018-07-26

## 2018-07-26 ENCOUNTER — LAB (OUTPATIENT)
Dept: ONCOLOGY | Facility: CLINIC | Age: 68
End: 2018-07-26

## 2018-07-26 VITALS
SYSTOLIC BLOOD PRESSURE: 121 MMHG | OXYGEN SATURATION: 99 % | BODY MASS INDEX: 26.17 KG/M2 | RESPIRATION RATE: 18 BRPM | HEART RATE: 82 BPM | DIASTOLIC BLOOD PRESSURE: 82 MMHG | TEMPERATURE: 97.2 F | WEIGHT: 134 LBS

## 2018-07-26 DIAGNOSIS — D64.9 ANEMIA, UNSPECIFIED TYPE: ICD-10-CM

## 2018-07-26 DIAGNOSIS — N18.2 CHRONIC KIDNEY DISEASE, STAGE II (MILD): Primary | ICD-10-CM

## 2018-07-26 LAB
ALBUMIN SERPL-MCNC: 4.1 G/DL (ref 3.4–4.8)
ALBUMIN/GLOB SERPL: 1.2 G/DL (ref 1.5–2.5)
ALP SERPL-CCNC: 86 U/L (ref 35–104)
ALT SERPL W P-5'-P-CCNC: 15 U/L (ref 10–36)
ANION GAP SERPL CALCULATED.3IONS-SCNC: 4.6 MMOL/L (ref 3.6–11.2)
AST SERPL-CCNC: 21 U/L (ref 10–30)
BASOPHILS # BLD AUTO: 0.04 10*3/MM3 (ref 0–0.3)
BASOPHILS NFR BLD AUTO: 0.6 % (ref 0–2)
BILIRUB SERPL-MCNC: 0.3 MG/DL (ref 0.2–1.8)
BUN BLD-MCNC: 19 MG/DL (ref 7–21)
BUN/CREAT SERPL: 16.2 (ref 7–25)
CALCIUM SPEC-SCNC: 8.9 MG/DL (ref 7.7–10)
CHLORIDE SERPL-SCNC: 107 MMOL/L (ref 99–112)
CO2 SERPL-SCNC: 26.4 MMOL/L (ref 24.3–31.9)
CREAT BLD-MCNC: 1.17 MG/DL (ref 0.43–1.29)
DEPRECATED RDW RBC AUTO: 43.4 FL (ref 37–54)
EOSINOPHIL # BLD AUTO: 0.13 10*3/MM3 (ref 0–0.7)
EOSINOPHIL NFR BLD AUTO: 1.9 % (ref 0–7)
ERYTHROCYTE [DISTWIDTH] IN BLOOD BY AUTOMATED COUNT: 14 % (ref 11.5–14.5)
FERRITIN SERPL-MCNC: 78 NG/ML (ref 10–290.3)
GFR SERPL CREATININE-BSD FRML MDRD: 56 ML/MIN/1.73
GLOBULIN UR ELPH-MCNC: 3.3 GM/DL
GLUCOSE BLD-MCNC: 88 MG/DL (ref 70–110)
HCT VFR BLD AUTO: 34.8 % (ref 37–47)
HGB BLD-MCNC: 11.6 G/DL (ref 12–16)
IMM GRANULOCYTES # BLD: 0.01 10*3/MM3 (ref 0–0.03)
IMM GRANULOCYTES NFR BLD: 0.1 % (ref 0–0.5)
IRON 24H UR-MRATE: 85 MCG/DL (ref 49–151)
IRON SATN MFR SERPL: 26 % (ref 15–50)
LYMPHOCYTES # BLD AUTO: 2.9 10*3/MM3 (ref 1–3)
LYMPHOCYTES NFR BLD AUTO: 42.8 % (ref 16–46)
MCH RBC QN AUTO: 29 PG (ref 27–33)
MCHC RBC AUTO-ENTMCNC: 33.3 G/DL (ref 33–37)
MCV RBC AUTO: 87 FL (ref 80–94)
MONOCYTES # BLD AUTO: 0.81 10*3/MM3 (ref 0.1–0.9)
MONOCYTES NFR BLD AUTO: 11.9 % (ref 0–12)
NEUTROPHILS # BLD AUTO: 2.89 10*3/MM3 (ref 1.4–6.5)
NEUTROPHILS NFR BLD AUTO: 42.7 % (ref 40–75)
OSMOLALITY SERPL CALC.SUM OF ELEC: 277.4 MOSM/KG (ref 273–305)
PLATELET # BLD AUTO: 254 10*3/MM3 (ref 130–400)
PMV BLD AUTO: 9.6 FL (ref 6–10)
POTASSIUM BLD-SCNC: 4 MMOL/L (ref 3.5–5.3)
PROT SERPL-MCNC: 7.4 G/DL (ref 6–8)
RBC # BLD AUTO: 4 10*6/MM3 (ref 4.2–5.4)
SODIUM BLD-SCNC: 138 MMOL/L (ref 135–153)
TIBC SERPL-MCNC: 324 MCG/DL (ref 241–421)
WBC NRBC COR # BLD: 6.78 10*3/MM3 (ref 4.5–12.5)

## 2018-07-26 PROCEDURE — 85025 COMPLETE CBC W/AUTO DIFF WBC: CPT | Performed by: INTERNAL MEDICINE

## 2018-07-26 PROCEDURE — 99213 OFFICE O/P EST LOW 20 MIN: CPT | Performed by: INTERNAL MEDICINE

## 2018-07-26 PROCEDURE — 83550 IRON BINDING TEST: CPT | Performed by: INTERNAL MEDICINE

## 2018-07-26 PROCEDURE — 82728 ASSAY OF FERRITIN: CPT | Performed by: INTERNAL MEDICINE

## 2018-07-26 PROCEDURE — 80053 COMPREHEN METABOLIC PANEL: CPT | Performed by: INTERNAL MEDICINE

## 2018-07-26 PROCEDURE — 83540 ASSAY OF IRON: CPT | Performed by: INTERNAL MEDICINE

## 2018-07-26 NOTE — PROGRESS NOTES
Name:  Gemma Hernandez  :  1950  Date:  2018     REFERRING PROVIDER  BAYRON Nielsen    PRIMARY CARE PROVIDER  Zaira Cleary MD    REASON FOR FOLLOWUP  1. Chronic kidney disease, stage II (mild)    2. Anemia, unspecified type      CHIEF COMPLAINT  Chronic fatigue, stable.    Dear Ms. Sibley,    HISTORY OF PRESENT ILLNESS:   I saw Ms. Hernandez in follow up today in our medical oncology clinic. As you are aware, she is a pleasant, 67 y.o.,  female with a history of rheumatoid arthritis and coronary artery disease who, in late summer 2014, was  also been noted to be borderline anemic. She was admitted to [facility] (in early 2014) after presenting to their ED with weakness and black stools. She has been on Plavix and ASA due to a history of stent placement; but, according to her, several repeated endoscopies (both upper and lower) over the years (including during  her most recent hospitalization ) have been largely unremarkable (per the discharge summary, mainly gastritis only). She was referred to our clinic for further outpatient evaluation regarding her anemia. At the time of her initial appointment with us (2014), she confirmed that she receives monthly injections with golimumab for her rheumatoid arthri tis (which she believes helps quite a bit). She also reported intermittent rectal bleeding (and, as mentioned above, was admitted for it in early 2014). A number of basic anemia st udies were ordered at that time, and it was ultimately felt that, while chronic inflammation (from her arthritis) and chronic kidney disease (likely from longstanding hypertension;  her creatinine is in the 1.2 - 1.3 range, and her erythropoietin level was not appropriately elevated) were contributing, iron deficiency was playing an increasing role (her ferritin level had decreased, from 32 to 17). She was started on, at most, maintenance ferrous sulfate and has  been doing overall well in follow up ever since, with improved (and now stable) iron stores and a stable, borderline low to low normal hemoglobin.     INTERIM HISTORY:  Ms. Hernandez returns to clinic today for follow up. She has been off of ferrous sulfate completely for about two and one half years now  She is still back to her usual, laxative-dependent, chronic constipation (with no noticeable, repeat GI bleeds recently). She otherwise continues to feel overall well and, other than chronic, but stable, fatigue, continues to have no new or specific complaints.    Past Medical History:   Diagnosis Date   • Anemia    • CAD (coronary artery disease)    • Chronic kidney disease    • Hypertension    • Rheumatoid arthritis    • Sleep apnea        Past Surgical History:   Procedure Laterality Date   • CARDIAC CATHETERIZATION     • CARDIAC CATHETERIZATION N/A 5/2/2017    Procedure: Left Heart Cath;  Surgeon: Dickson Lea MD;  Location: [facility] CATH INVASIVE LOCATION;  Service:    • HERNIA REPAIR      1972   • TUBAL ABDOMINAL LIGATION      1972       Social History     Social History   • Marital status:      Spouse name: N/A   • Number of children: N/A   • Years of education: N/A     Occupational History   • Not on file.     Social History Main Topics   • Smoking status: Former Smoker     Packs/day: 3.00     Years: 31.00     Quit date: 2001   • Smokeless tobacco: Never Used   • Alcohol use No   • Drug use: No   • Sexual activity: Defer     Other Topics Concern   • Not on file     Social History Narrative   • No narrative on file       Family History   Problem Relation Age of Onset   • Cancer Other         breast   • Lymphoma Other    • Aneurysm Father    • Cancer Sister    • Clotting disorder Sister    • Aneurysm Brother        Allergies   Allergen Reactions   • Arthritis Pain Regimen [Aspirin] Hives and Nausea And Vomiting     All oral arthritis medications   • Sulfa Antibiotics        Current Outpatient  Prescriptions   Medication Sig Dispense Refill   • Acetaminophen (TYLENOL) 325 MG capsule Take 650 mg by mouth Every 8 (Eight) Hours As Needed.     • alendronate (FOSAMAX) 70 MG tablet Every 7 (Seven) Days.     • ASPIR-LOW 81 MG EC tablet      • CARTIA  MG 24 hr capsule Take 240 mg by mouth Daily.     • cetirizine (ZyrTEC) 10 MG tablet Take 10 mg by mouth Daily.     • clonazePAM (KlonoPIN) 1 MG tablet 1 mg 2 (Two) Times a Day As Needed.     • clopidogrel (PLAVIX) 75 MG tablet      • cyclobenzaprine (FLEXERIL) 10 MG tablet Take 10 mg by mouth 3 (Three) Times a Day As Needed.  1   • fluticasone (FLONASE) 50 MCG/ACT nasal spray 2 sprays into each nostril Daily.     • gabapentin (NEURONTIN) 400 MG capsule Take 400 mg by mouth 3 (Three) Times a Day.     • HYDROcodone-acetaminophen (NORCO) 7.5-325 MG per tablet 1 tablet 2 (Two) Times a Day.     • lisinopril (PRINIVIL,ZESTRIL) 10 MG tablet Take 10 mg by mouth Daily.     • metoclopramide (REGLAN) 10 MG tablet Take 10 mg by mouth 4 (Four) Times a Day.  1   • nitrofurantoin, macrocrystal-monohydrate, (MACROBID) 100 MG capsule Take 1 capsule by mouth Daily. 20 capsule 0   • nitroglycerin (NITRODUR) 0.6 MG/HR patch      • NITROSTAT 0.4 MG SL tablet      • oxybutynin (DITROPAN) 5 MG tablet TAKE ONE TABLET BY MOUTH EVERY NIGHT AT BEDTIME 30 tablet 11   • pravastatin (PRAVACHOL) 40 MG tablet 40 mg Daily.     • promethazine (PHENERGAN) 25 MG tablet Take 25 mg by mouth Every 6 (Six) Hours As Needed.     • SIMPONI 50 MG/0.5ML solution auto-injector Inject 1 dose into the shoulder, thigh, or buttocks Every 30 (Thirty) Days.     • simvastatin (ZOCOR) 20 MG tablet Take 20 mg by mouth Daily.     • VOLTAREN 1 % gel gel Apply 4 g topically 2 (Two) Times a Day.       No current facility-administered medications for this visit.        REVIEW OF SYSTEMS  CONSTITUTIONAL:  No fever, chills or night sweats. Chronic, mild fatigue, stable.  EYES:  No blurry vision, diplopia or other vision  changes.  ENT:  No hearing loss, nosebleeds or sore throat.  CARDIOVASCULAR:  No palpitations, arrhythmia, syncopal episodes or edema.  PULMONARY:  No hemoptysis, wheezing, chronic cough or shortness of breath.  GASTROINTESTINAL:  No nausea or vomiting.  No abdominal pain. Chronic constipation, stable and still manageable with prn laxatives.  GENITOURINARY:  No hematuria, kidney stones or frequent urination.  MUSCULOSKELETAL:  No joint or back pains.  INTEGUMENTARY: No rashes or pruritus.  ENDOCRINE:  No excessive thirst or hot flashes.  HEMATOLOGIC:  No history of free bleeding, spontaneous bleeding or clotting.  IMMUNOLOGIC:  No allergies or frequent infections.  NEUROLOGIC: No numbness, tingling, seizures or weakness.  PSYCHIATRIC:  No anxiety or depression.    PHYSICAL EXAMINATION  /82   Pulse 82   Temp 97.2 °F (36.2 °C) (Oral)   Resp 18   Wt 60.8 kg (134 lb)   SpO2 99%   BMI 26.17 kg/m²     GENERAL:  A well-developed, well-nourished,  female in no acute distress.  HEENT:  Pupils equally round and reactive to light.  Extraocular muscles intact.  CARDIOVASCULAR:  Regular rate and rhythm. No murmurs, gallops or rubs.  LUNGS:  Clear to auscultation bilaterally.  ABDOMEN:  Soft, nontender, nondistended with positive bowel sounds.  EXTREMITIES:  No clubbing, cyanosis or edema bilaterally.  SKIN:  No rashes or petechiae.  NEURO:  Cranial nerves grossly intact.  No focal deficits.  PSYCH:  Alert and oriented x3.    LABORATORY    Lab Results   Component Value Date    WBC 6.78 07/26/2018    HGB 11.6 (L) 07/26/2018    HCT 34.8 (L) 07/26/2018    MCV 87.0 07/26/2018     07/26/2018    NEUTROABS 2.89 07/26/2018       Lab Results   Component Value Date     07/26/2018    K 4.0 07/26/2018     07/26/2018    CO2 26.4 07/26/2018    BUN 19 07/26/2018    CREATININE 1.17 07/26/2018    GLUCOSE 88 07/26/2018    CALCIUM 8.9 07/26/2018    AST 21 07/26/2018    ALT 15 07/26/2018    ALKPHOS 86  07/26/2018    BILITOT 0.3 07/26/2018    PROTEINTOT 7.4 07/26/2018    ALBUMIN 4.10 07/26/2018     CBC (07/26/2018): WBCs: 6.7; HgB: 11.6; Hct: 34.8; platelets: 254; MCV: 87.0  CBC (01/25/2018): WBCs: 5.8; HgB: 11.1; Hct: 34.2; platelets: 251; MCV: 87.5  CBC (07/26/2017): WBCs: 6.1; HgB: 12.0; Hct: 36.0; platelets: 231; MCV: 84.9  CBC (05/02/2017): WBCs: 5.8; HgB: 11.2; Hct: 35.4; platelets: 229; MCV: 89.8  CBC (01/20/2017): WBCs: 5.8; HgB: 12.2; Hct: 36.4; platelets: 217; MCV: 86.5  CBC (07/20/2016): WBCs: 5.4; HgB: 11.4; Hct: 35.0; platelets: 228; MCV: 86.8  CBC (12/28/2015): WBCs: 5.1; HgB: 11.3; Hct: 32.7; platelets: 208; MCV: 81.5    CBC (06/26/2015): WBCs: 4.6; HgB: 11.7; Hct: 36.6; platelets: 226; MCV: 85.7    CBC (03/31/2015): WBCs: 5.5; HgB: 12.6; Hct: 39.2; platelets: 246; MCV: 86.5    CBC (12/18/2014): WBCs: 4.7; HgB: 11.9; Hct: 36.2; platelets: 238; MCV: 86.0    CBC (09/30/2014): WBCs: 5.6; HgB: 10.7; Hct: 33.3; platelets: 213; MCV: 85.6    CBC (08/02/2014): WBCs: 5.2; HgB: 11.5; Hct: 36.4; platelets: 239; MCV: 86     Iron profile (07/26/2018): serum iron: 85; % saturation: 26; TIBC: 324; ferritin: 78.0 ng/mL  Iron profile (01/25/2018): serum iron: 75; % saturation: 22; TIBC: 344; ferritin: 52.0  Iron profile (07/26/2017): serum iron: 88; % saturation: 27; TIBC: 321; ferritin: 54.0  Iron profile (01/20/2017): serum iron: 85; % saturation: 26; TIBC: 328; ferritin: 51.0  Iron profile (07/20/2016): serum iron: 60; % saturation: 18; TIBC: 327; ferritin: 40.0    Iron profile (12/28/2015): serum iron: 83; % saturation: 28; TIBC: 292; ferritin: 60.0    Iron profile (06/26/2015): serum iron: 102; % saturation: 32; TIBC: 320; ferritin: 47.0    Iron profile (03/31/2015): serum iron: 96; % saturation: 29; TIBC: 331; ferritin: 58.0    Iron profile (12/18/2014): serum iron: 55; % saturation: 21; TIBC: 263; ferritin: 34.0     Vitamin B12 (01/20/2017): 836 pg/mL; serum folate (01/20/2017): 13.70 ng/mL  Vitamin B12  (03/31/2015): 849; serum folate (03/31/2015): > 24.00    TSH (12/18/2014): 0.889; free T4: 0.87      Studies from 09/16/2014:    Iron panel: serum iron: 130; % saturation: 40; TIBC: 327; ferritin: 17.0    Vitamin B12: 538; methylmalonic acid: 155; serum folate: 18.89    Retic count: Percent: 0.8%; Absolute: 0.03    TSH: 0.747; Haptoglobin: 151    Erythropoietin, serum: 9.0      Serum ferritin (07/30/2014): 32.0      IMAGING    PATHOLOGY    IMPRESSION AND PLAN  Ms. Hernandez is a 67 y.o.,  female with:  Anemia: Multifactorial and mild, with a HgB generally in the low to mid 11 range, at worst, since at least mid-2014, and otherwise unremarkable CBCs. Based on the initial workup performed at her initial clinic visit with us in September 2014, a few issues appear to have been (and some still are) playing a role: 1) Although her serum iron has remained within normal limits, her iron stores had been on the lower side; and, by fall 2014, were getting worse (ferritin decreased from 32 ng/mL in late July to 17 by September 2014), suggesting a borderline iron deficiency that was not surprising, given her history of intermittent rectal bleeding and lifelong, routine menstrual blood loss (up until the late 2000s), 2) Her baseline creatinine of 1.1 - 1.3 and lack of an increased erythropoietin level suggests a component of chronic kidney disease, which would not be surprising given her history of hypertension and arterosclerotic disease, and 3) Although not severely elevated, the high normal ESR and history of rheumatoid arthritis may be contributing via an additional component of chronic inflammation. The most readily reversible issue, the borderline iron stores, were treated with a new Rx for ferrous sulfate and today, nearly four years later, they are now replete and stable (ferritin of 78.0 ng/mL today). She has actually not been taking ferrous sulfate at all for about two and one half years now. Meanwhile,  her HgB remains borderline low at worst (11.6 g/dL today) and also very stable. We will continue to help monitor her on a routine basis (now annually, given how stable and overall unremarkable her labs have been) with repeat labs and clinic visits. The patient was in agreement with this plan.    ACO Quality Measures:  a) The patient does not currently use tobacco products (she is a former smoker).  b) The patient's BMI is above normal parameters. Plan includes a referral to primary care.  c) The current outpatient and discharge medications have been reconciled for the patient.    It is a pleasure to participate in Ms. Hernandez's care. Please do not hesitate to call with any questions or concerns that you may have.    A total of 20 minutes were spent coordinating this patient’s care in clinic today; more than 50% of this time was face-to-face with the patient, reviewing her interim medical history, discussing the results of today's labs and counseling on the current followup plan. All questions were answered to her satisfaction.    FOLLOW UP  Return to clinic in 1 year with a CBC, CMP, iron panel and ferritin level.        This document was electronically signed by MELISSA Yap MD July 26, 2018 1:48 PM      CC: BAYRON Nielsen

## 2019-08-12 ENCOUNTER — LAB (OUTPATIENT)
Dept: ONCOLOGY | Facility: CLINIC | Age: 69
End: 2019-08-12

## 2019-08-12 ENCOUNTER — OFFICE VISIT (OUTPATIENT)
Dept: ONCOLOGY | Facility: CLINIC | Age: 69
End: 2019-08-12

## 2019-08-12 VITALS
SYSTOLIC BLOOD PRESSURE: 91 MMHG | WEIGHT: 128.5 LBS | DIASTOLIC BLOOD PRESSURE: 54 MMHG | HEART RATE: 62 BPM | RESPIRATION RATE: 18 BRPM | OXYGEN SATURATION: 98 % | BODY MASS INDEX: 25.1 KG/M2 | TEMPERATURE: 98.3 F

## 2019-08-12 VITALS
DIASTOLIC BLOOD PRESSURE: 54 MMHG | TEMPERATURE: 98.3 F | HEART RATE: 62 BPM | WEIGHT: 128.5 LBS | SYSTOLIC BLOOD PRESSURE: 91 MMHG | RESPIRATION RATE: 18 BRPM | OXYGEN SATURATION: 98 % | BODY MASS INDEX: 25.1 KG/M2

## 2019-08-12 DIAGNOSIS — D64.9 ANEMIA, UNSPECIFIED TYPE: Primary | ICD-10-CM

## 2019-08-12 LAB
ALBUMIN SERPL-MCNC: 4.08 G/DL (ref 3.5–5.2)
ALBUMIN/GLOB SERPL: 1.2 G/DL
ALP SERPL-CCNC: 56 U/L (ref 39–117)
ALT SERPL W P-5'-P-CCNC: 8 U/L (ref 1–33)
ANION GAP SERPL CALCULATED.3IONS-SCNC: 9.9 MMOL/L (ref 5–15)
AST SERPL-CCNC: 14 U/L (ref 1–32)
BASOPHILS # BLD AUTO: 0.03 10*3/MM3 (ref 0–0.2)
BASOPHILS NFR BLD AUTO: 0.6 % (ref 0–1.5)
BILIRUB SERPL-MCNC: 0.3 MG/DL (ref 0.2–1.2)
BUN BLD-MCNC: 24 MG/DL (ref 8–23)
BUN/CREAT SERPL: 16.1 (ref 7–25)
CALCIUM SPEC-SCNC: 9.8 MG/DL (ref 8.6–10.5)
CHLORIDE SERPL-SCNC: 104 MMOL/L (ref 98–107)
CO2 SERPL-SCNC: 23.1 MMOL/L (ref 22–29)
CREAT BLD-MCNC: 1.49 MG/DL (ref 0.57–1)
DEPRECATED RDW RBC AUTO: 44 FL (ref 37–54)
EOSINOPHIL # BLD AUTO: 0.12 10*3/MM3 (ref 0–0.4)
EOSINOPHIL NFR BLD AUTO: 2.3 % (ref 0.3–6.2)
ERYTHROCYTE [DISTWIDTH] IN BLOOD BY AUTOMATED COUNT: 13.9 % (ref 12.3–15.4)
FERRITIN SERPL-MCNC: 72 NG/ML (ref 13–150)
GFR SERPL CREATININE-BSD FRML MDRD: 42 ML/MIN/1.73
GLOBULIN UR ELPH-MCNC: 3.3 GM/DL
GLUCOSE BLD-MCNC: 108 MG/DL (ref 65–99)
HCT VFR BLD AUTO: 33.7 % (ref 34–46.6)
HGB BLD-MCNC: 10.8 G/DL (ref 12–15.9)
IMM GRANULOCYTES # BLD AUTO: 0.01 10*3/MM3 (ref 0–0.05)
IMM GRANULOCYTES NFR BLD AUTO: 0.2 % (ref 0–0.5)
IRON 24H UR-MRATE: 90 MCG/DL (ref 37–145)
IRON SATN MFR SERPL: 25 % (ref 20–50)
LYMPHOCYTES # BLD AUTO: 2.27 10*3/MM3 (ref 0.7–3.1)
LYMPHOCYTES NFR BLD AUTO: 43 % (ref 19.6–45.3)
MCH RBC QN AUTO: 28.4 PG (ref 26.6–33)
MCHC RBC AUTO-ENTMCNC: 32 G/DL (ref 31.5–35.7)
MCV RBC AUTO: 88.7 FL (ref 79–97)
MONOCYTES # BLD AUTO: 0.62 10*3/MM3 (ref 0.1–0.9)
MONOCYTES NFR BLD AUTO: 11.7 % (ref 5–12)
NEUTROPHILS # BLD AUTO: 2.23 10*3/MM3 (ref 1.7–7)
NEUTROPHILS NFR BLD AUTO: 42.2 % (ref 42.7–76)
PLATELET # BLD AUTO: 243 10*3/MM3 (ref 140–450)
PMV BLD AUTO: 9.7 FL (ref 6–12)
POTASSIUM BLD-SCNC: 4.9 MMOL/L (ref 3.5–5.2)
PROT SERPL-MCNC: 7.4 G/DL (ref 6–8.5)
RBC # BLD AUTO: 3.8 10*6/MM3 (ref 3.77–5.28)
SODIUM BLD-SCNC: 137 MMOL/L (ref 136–145)
TIBC SERPL-MCNC: 359 MCG/DL (ref 298–536)
TRANSFERRIN SERPL-MCNC: 241 MG/DL (ref 200–360)
WBC NRBC COR # BLD: 5.28 10*3/MM3 (ref 3.4–10.8)

## 2019-08-12 PROCEDURE — 36415 COLL VENOUS BLD VENIPUNCTURE: CPT | Performed by: INTERNAL MEDICINE

## 2019-08-12 PROCEDURE — 80053 COMPREHEN METABOLIC PANEL: CPT | Performed by: INTERNAL MEDICINE

## 2019-08-12 PROCEDURE — 85025 COMPLETE CBC W/AUTO DIFF WBC: CPT | Performed by: INTERNAL MEDICINE

## 2019-08-12 PROCEDURE — 99214 OFFICE O/P EST MOD 30 MIN: CPT | Performed by: INTERNAL MEDICINE

## 2019-08-12 PROCEDURE — 82728 ASSAY OF FERRITIN: CPT | Performed by: INTERNAL MEDICINE

## 2019-08-12 PROCEDURE — 84466 ASSAY OF TRANSFERRIN: CPT | Performed by: INTERNAL MEDICINE

## 2019-08-12 PROCEDURE — 83540 ASSAY OF IRON: CPT | Performed by: INTERNAL MEDICINE

## 2019-08-12 RX ORDER — RANOLAZINE 500 MG/1
1000 TABLET, EXTENDED RELEASE ORAL 2 TIMES DAILY
COMMUNITY

## 2019-08-12 NOTE — PROGRESS NOTES
Name:  Gemma Hernandez  :  1950  Date:  2019     REFERRING PROVIDER  BAYRON Nielsen    PRIMARY CARE PROVIDER  Zaira Cleary MD    REASON FOR FOLLOWUP  1. Anemia, unspecified type      CHIEF COMPLAINT  Chronic fatigue, stable. Intermittent nausea.    Dear Ms. Sibley,    HISTORY OF PRESENT ILLNESS:   I saw Ms. Hernandez in follow up today in our medical oncology clinic. As you are aware, she is a pleasant, 68 y.o.,  female with a history of rheumatoid arthritis and coronary artery disease who, in late summer 2014, was  also been noted to be borderline anemic. She was admitted to [facility] (in early 2014) after presenting to their ED with weakness and black stools. She has been on Plavix and ASA due to a history of stent placement; but, according to her, several repeated endoscopies (both upper and lower) over the years (including during  her most recent hospitalization ) have been largely unremarkable (per the discharge summary, mainly gastritis only). She was referred to our clinic for further outpatient evaluation regarding her anemia. At the time of her initial appointment with us (2014), she confirmed that she receives monthly injections with golimumab for her rheumatoid arthri tis (which she believes helps quite a bit). She also reported intermittent rectal bleeding (and, as mentioned above, was admitted for it in early 2014). A number of basic anemia st udies were ordered at that time, and it was ultimately felt that, while chronic inflammation (from her arthritis) and chronic kidney disease (likely from longstanding hypertension;  her creatinine is in the 1.2 - 1.3 range, and her erythropoietin level was not appropriately elevated) were contributing, iron deficiency was playing an increasing role (her ferritin level had decreased, from 32 to 17). She was started on, at most, maintenance ferrous sulfate and has been doing overall well in  follow up ever since, with improved (and now stable) iron stores and a stable, borderline low to low normal hemoglobin.     INTERIM HISTORY:  Ms. Hernandez returns to clinic today for follow up. She has been off of ferrous sulfate completely for about three and one half (3.5) years now  She is still back to her usual, laxative-dependent, chronic constipation; but an endoscopic evaluation is apparently planned to take place at the [facility] for some new onset abdominal symptoms she has been having for the past six months or more. She otherwise feels about the same as she usually does and has no other complaints.    Past Medical History:   Diagnosis Date   • Anemia    • CAD (coronary artery disease)    • Chronic kidney disease    • Hypertension    • Rheumatoid arthritis    • Sleep apnea        Past Surgical History:   Procedure Laterality Date   • CARDIAC CATHETERIZATION     • CARDIAC CATHETERIZATION N/A 2017    Procedure: Left Heart Cath;  Surgeon: Dickson Lea MD;  Location: [facility] CATH INVASIVE LOCATION;  Service:    • HERNIA REPAIR         • TUBAL ABDOMINAL LIGATION             Social History     Socioeconomic History   • Marital status:      Spouse name: Not on file   • Number of children: Not on file   • Years of education: Not on file   • Highest education level: Not on file   Tobacco Use   • Smoking status: Former Smoker     Packs/day: 3.00     Years: 31.00     Pack years: 93.00     Last attempt to quit:      Years since quittin.6   • Smokeless tobacco: Never Used   Substance and Sexual Activity   • Alcohol use: No   • Drug use: No   • Sexual activity: Defer       Family History   Problem Relation Age of Onset   • Cancer Other         breast   • Lymphoma Other    • Aneurysm Father    • Cancer Sister    • Clotting disorder Sister    • Aneurysm Brother        Allergies   Allergen Reactions   • Arthritis Pain Regimen [Aspirin] Hives and Nausea And Vomiting     All  oral arthritis medications   • Sulfa Antibiotics        Current Outpatient Medications   Medication Sig Dispense Refill   • Acetaminophen (TYLENOL) 325 MG capsule Take 650 mg by mouth Every 8 (Eight) Hours As Needed.     • alendronate (FOSAMAX) 70 MG tablet Every 7 (Seven) Days.     • ASPIR-LOW 81 MG EC tablet      • CARTIA  MG 24 hr capsule Take 240 mg by mouth Daily.     • cetirizine (ZyrTEC) 10 MG tablet Take 10 mg by mouth Daily.     • clonazePAM (KlonoPIN) 1 MG tablet 1 mg 2 (Two) Times a Day As Needed.     • clopidogrel (PLAVIX) 75 MG tablet      • cyclobenzaprine (FLEXERIL) 10 MG tablet Take 10 mg by mouth 3 (Three) Times a Day As Needed.  1   • fluticasone (FLONASE) 50 MCG/ACT nasal spray 2 sprays into each nostril Daily.     • gabapentin (NEURONTIN) 400 MG capsule Take 400 mg by mouth 3 (Three) Times a Day.     • HYDROcodone-acetaminophen (NORCO) 7.5-325 MG per tablet 1 tablet 2 (Two) Times a Day.     • lisinopril (PRINIVIL,ZESTRIL) 10 MG tablet Take 10 mg by mouth Daily.     • metoclopramide (REGLAN) 10 MG tablet Take 10 mg by mouth 4 (Four) Times a Day.  1   • nitrofurantoin, macrocrystal-monohydrate, (MACROBID) 100 MG capsule Take 1 capsule by mouth Daily. 20 capsule 0   • nitroglycerin (NITRODUR) 0.6 MG/HR patch      • NITROSTAT 0.4 MG SL tablet      • oxybutynin (DITROPAN) 5 MG tablet TAKE ONE TABLET BY MOUTH EVERY NIGHT AT BEDTIME 30 tablet 11   • pravastatin (PRAVACHOL) 40 MG tablet 40 mg Daily.     • promethazine (PHENERGAN) 25 MG tablet Take 25 mg by mouth Every 6 (Six) Hours As Needed.     • ranolazine (RANEXA) 500 MG 12 hr tablet Take 1,000 mg by mouth 2 (Two) Times a Day.     • SIMPONI 50 MG/0.5ML solution auto-injector Inject 1 dose into the shoulder, thigh, or buttocks Every 30 (Thirty) Days.     • simvastatin (ZOCOR) 20 MG tablet Take 20 mg by mouth Daily.     • VOLTAREN 1 % gel gel Apply 4 g topically 2 (Two) Times a Day.       No current facility-administered medications for this  visit.      REVIEW OF SYSTEMS  CONSTITUTIONAL:  No fever, chills or night sweats. Chronic, mild fatigue, stable.  EYES:  No blurry vision, diplopia or other vision changes.  ENT:  No hearing loss, nosebleeds or sore throat.  CARDIOVASCULAR:  No palpitations, arrhythmia, syncopal episodes or edema.  PULMONARY:  No hemoptysis, wheezing, chronic cough or shortness of breath.  GASTROINTESTINAL:  As per the HPI above.  GENITOURINARY:  No hematuria, kidney stones or frequent urination.  MUSCULOSKELETAL:  No joint or back pains.  INTEGUMENTARY: No rashes or pruritus.  ENDOCRINE:  No excessive thirst or hot flashes.  HEMATOLOGIC:  No history of free bleeding, spontaneous bleeding or clotting.  IMMUNOLOGIC:  No allergies or frequent infections.  NEUROLOGIC: No numbness, tingling, seizures or weakness.  PSYCHIATRIC:  No anxiety or depression.    PHYSICAL EXAMINATION  BP 91/54   Pulse 62   Temp 98.3 °F (36.8 °C) (Oral)   Resp 18   Wt 58.3 kg (128 lb 8 oz)   SpO2 98%   BMI 25.10 kg/m²     GENERAL:  A well-developed, well-nourished,  female in no acute distress.  HEENT:  Pupils equally round and reactive to light.  Extraocular muscles intact.  CARDIOVASCULAR:  Regular rate and rhythm. No murmurs, gallops or rubs.  LUNGS:  Clear to auscultation bilaterally.  ABDOMEN:  Soft, nontender, nondistended with positive bowel sounds.  EXTREMITIES:  No clubbing, cyanosis or edema bilaterally.  SKIN:  No rashes or petechiae.  NEURO:  Cranial nerves grossly intact.  No focal deficits.  PSYCH:  Alert and oriented x3.    LABORATORY    Lab Results   Component Value Date    WBC 5.28 08/12/2019    HGB 10.8 (L) 08/12/2019    HCT 33.7 (L) 08/12/2019    MCV 88.7 08/12/2019     08/12/2019    NEUTROABS 2.23 08/12/2019       Lab Results   Component Value Date     08/12/2019    K 4.9 08/12/2019     08/12/2019    CO2 23.1 08/12/2019    BUN 24 (H) 08/12/2019    CREATININE 1.49 (H) 08/12/2019    GLUCOSE 108 (H)  08/12/2019    CALCIUM 9.8 08/12/2019    AST 14 08/12/2019    ALT 8 08/12/2019    ALKPHOS 56 08/12/2019    BILITOT 0.3 08/12/2019    PROTEINTOT 7.4 08/12/2019    ALBUMIN 4.08 08/12/2019     CBC (08/12/2019): WBCs: 5.2; HgB: 10.8; Hct: 33.7; platelets: 243; MCV: 88.7  CBC (07/26/2018): WBCs: 6.7; HgB: 11.6; Hct: 34.8; platelets: 254; MCV: 87.0  CBC (01/25/2018): WBCs: 5.8; HgB: 11.1; Hct: 34.2; platelets: 251; MCV: 87.5  CBC (07/26/2017): WBCs: 6.1; HgB: 12.0; Hct: 36.0; platelets: 231; MCV: 84.9  CBC (05/02/2017): WBCs: 5.8; HgB: 11.2; Hct: 35.4; platelets: 229; MCV: 89.8  CBC (01/20/2017): WBCs: 5.8; HgB: 12.2; Hct: 36.4; platelets: 217; MCV: 86.5  CBC (07/20/2016): WBCs: 5.4; HgB: 11.4; Hct: 35.0; platelets: 228; MCV: 86.8  CBC (12/28/2015): WBCs: 5.1; HgB: 11.3; Hct: 32.7; platelets: 208; MCV: 81.5    CBC (06/26/2015): WBCs: 4.6; HgB: 11.7; Hct: 36.6; platelets: 226; MCV: 85.7    CBC (03/31/2015): WBCs: 5.5; HgB: 12.6; Hct: 39.2; platelets: 246; MCV: 86.5    CBC (12/18/2014): WBCs: 4.7; HgB: 11.9; Hct: 36.2; platelets: 238; MCV: 86.0    CBC (09/30/2014): WBCs: 5.6; HgB: 10.7; Hct: 33.3; platelets: 213; MCV: 85.6    CBC (08/02/2014): WBCs: 5.2; HgB: 11.5; Hct: 36.4; platelets: 239; MCV: 86     Iron profile (08/12/2019): serum iron: 90; % saturation: 25; TIBC: 359; ferritin: 72.0 ng/mL  Iron profile (07/26/2018): serum iron: 85; % saturation: 26; TIBC: 324; ferritin: 78.0  Iron profile (01/25/2018): serum iron: 75; % saturation: 22; TIBC: 344; ferritin: 52.0  Iron profile (07/26/2017): serum iron: 88; % saturation: 27; TIBC: 321; ferritin: 54.0  Iron profile (01/20/2017): serum iron: 85; % saturation: 26; TIBC: 328; ferritin: 51.0  Iron profile (07/20/2016): serum iron: 60; % saturation: 18; TIBC: 327; ferritin: 40.0    Iron profile (12/28/2015): serum iron: 83; % saturation: 28; TIBC: 292; ferritin: 60.0    Iron profile (06/26/2015): serum iron: 102; % saturation: 32; TIBC: 320; ferritin: 47.0    Iron profile  (03/31/2015): serum iron: 96; % saturation: 29; TIBC: 331; ferritin: 58.0    Iron profile (12/18/2014): serum iron: 55; % saturation: 21; TIBC: 263; ferritin: 34.0     Vitamin B12 (01/20/2017): 836 pg/mL; serum folate (01/20/2017): 13.70 ng/mL  Vitamin B12 (03/31/2015): 849; serum folate (03/31/2015): > 24.00    TSH (12/18/2014): 0.889; free T4: 0.87      Studies from 09/16/2014:    Iron panel: serum iron: 130; % saturation: 40; TIBC: 327; ferritin: 17.0    Vitamin B12: 538; methylmalonic acid: 155; serum folate: 18.89    Retic count: Percent: 0.8%; Absolute: 0.03    TSH: 0.747; Haptoglobin: 151    Erythropoietin, serum: 9.0      Serum ferritin (07/30/2014): 32.0      IMAGING    PATHOLOGY    IMPRESSION AND PLAN  Ms. Hernandez is a 68 y.o.,  female with:  Anemia: Multifactorial and mild, with a HgB generally in the low to mid 11 range, at worst, since at least mid-2014, and otherwise unremarkable CBCs. Based on the workup performed at her initial clinic visit with us in September 2014, a few issues appear to have been (and some still are) playing a role: 1) Although her serum iron has remained within normal limits, her iron stores had been on the lower side; and, by fall 2014, were getting worse (ferritin decreased from 32 ng/mL in late July to 17 by September 2014), suggesting a borderline iron deficiency that was not surprising, given her history of intermittent rectal bleeding and lifelong, routine menstrual blood loss (up until the late 2000s), 2) Her baseline creatinine of 1.1 - 1.3 and lack of an increased erythropoietin level suggests a component of chronic kidney disease, which would not be surprising given her history of hypertension and arterosclerotic disease, and 3) Although not severely elevated, the high normal ESR and history of rheumatoid arthritis may be contributing via an additional component of chronic inflammation. The most readily reversible issue, the borderline iron stores, were  treated with a new Rx for ferrous sulfate and today, ~five (5) years later, they continue to be replete and stable (ferritin of 72.0 ng/mL today). She has actually not been taking ferrous sulfate at all for about three and one half (3.5) years now. Meanwhile, her HgB remains borderline low at worst (10.8 g/dL today) and also stable. We will continue to help monitor her on a routine basis (now still on an annual basis, given how stable and overall unremarkable her labs have been, and continue to be) with repeat labs and clinic visits. The patient was in agreement with this plan.    ACO Quality Measures:  a) The patient does not currently use tobacco products (she is a former smoker).  b) The patient's BMI is above normal parameters. Plan includes a referral to primary care.  c) The current outpatient and discharge medications have been reconciled for the patient.    It is a pleasure to participate in Ms. Hernandez's care. Please do not hesitate to call with any questions or concerns that you may have.    A total of 30 minutes were spent coordinating this patient’s care in clinic today; more than 50% of this time was face-to-face with the patient, reviewing her interim medical history, discussing the results of today's labs and counseling on the current followup plan. All questions were answered to her satisfaction.    FOLLOW [facility]  [facility], as previously planned. Return to our clinic in 1 year with a CBC, CMP, iron panel and ferritin level.        This document was electronically signed by MELISSA Yap MD August 12, 2019 2:51 PM      CC: BAYRON Nielsen

## (undated) DEVICE — PK CATH CARD 10

## (undated) DEVICE — CATH DIAG EXPO M/ PK 5F FL4/FR4 PIG

## (undated) DEVICE — MODEL AT P54, P/N 700608-035KIT CONTENTS: HAND CONTROLLER, 3-WAY HIGH-PRESSURE STOPCOCK WITH ROTATING END AND PREMIUM HIGH-PRESSURE TUBING: Brand: ANGIOTOUCH® KIT

## (undated) DEVICE — CATH DIAG EXPO .045 FL3  5F 100CM

## (undated) DEVICE — MODEL BT2000 P/N 700287-012KIT CONTENTS: MANIFOLD WITH SALINE AND CONTRAST PORTS, SALINE TUBING WITH SPIKE AND HAND SYRINGE, TRANSDUCER: Brand: BT2000 AUTOMATED MANIFOLD KIT

## (undated) DEVICE — GLIDESHEATH SLENDER STAINLESS STEEL KIT: Brand: GLIDESHEATH SLENDER

## (undated) DEVICE — GW INQWIRE FC PTFE STD J/1.5 .035 260

## (undated) DEVICE — DEV COMP RAD PRELUDESYNC 24CM